# Patient Record
Sex: MALE | Race: WHITE | URBAN - METROPOLITAN AREA
[De-identification: names, ages, dates, MRNs, and addresses within clinical notes are randomized per-mention and may not be internally consistent; named-entity substitution may affect disease eponyms.]

---

## 2017-08-21 ENCOUNTER — OFFICE VISIT (OUTPATIENT)
Dept: FAMILY MEDICINE | Facility: CLINIC | Age: 27
End: 2017-08-21
Payer: COMMERCIAL

## 2017-08-21 VITALS
BODY MASS INDEX: 22.79 KG/M2 | WEIGHT: 162.8 LBS | SYSTOLIC BLOOD PRESSURE: 132 MMHG | HEIGHT: 71 IN | DIASTOLIC BLOOD PRESSURE: 78 MMHG | HEART RATE: 72 BPM | TEMPERATURE: 97.9 F | OXYGEN SATURATION: 99 %

## 2017-08-21 DIAGNOSIS — Z00.00 ROUTINE GENERAL MEDICAL EXAMINATION AT A HEALTH CARE FACILITY: Primary | ICD-10-CM

## 2017-08-21 DIAGNOSIS — F11.11 HISTORY OF OPIOID ABUSE (H): ICD-10-CM

## 2017-08-21 PROCEDURE — 99395 PREV VISIT EST AGE 18-39: CPT | Performed by: PHYSICIAN ASSISTANT

## 2017-08-21 PROCEDURE — 84443 ASSAY THYROID STIM HORMONE: CPT | Performed by: PHYSICIAN ASSISTANT

## 2017-08-21 PROCEDURE — 87389 HIV-1 AG W/HIV-1&-2 AB AG IA: CPT | Performed by: PHYSICIAN ASSISTANT

## 2017-08-21 PROCEDURE — 80053 COMPREHEN METABOLIC PANEL: CPT | Performed by: PHYSICIAN ASSISTANT

## 2017-08-21 PROCEDURE — 84439 ASSAY OF FREE THYROXINE: CPT | Performed by: PHYSICIAN ASSISTANT

## 2017-08-21 PROCEDURE — 86803 HEPATITIS C AB TEST: CPT | Performed by: PHYSICIAN ASSISTANT

## 2017-08-21 PROCEDURE — 36415 COLL VENOUS BLD VENIPUNCTURE: CPT | Performed by: PHYSICIAN ASSISTANT

## 2017-08-21 RX ORDER — MULTIPLE VITAMINS W/ MINERALS TAB 9MG-400MCG
1 TAB ORAL DAILY
COMMUNITY

## 2017-08-21 NOTE — NURSING NOTE
"Chief Complaint   Patient presents with     New Patient     Physical       Initial /78  Pulse 72  Temp 97.9  F (36.6  C) (Oral)  Ht 5' 10.75\" (1.797 m)  Wt 162 lb 12.8 oz (73.8 kg)  SpO2 99%  BMI 22.87 kg/m2 Estimated body mass index is 22.87 kg/(m^2) as calculated from the following:    Height as of this encounter: 5' 10.75\" (1.797 m).    Weight as of this encounter: 162 lb 12.8 oz (73.8 kg).  Medication Reconciliation: complete    "

## 2017-08-21 NOTE — MR AVS SNAPSHOT
After Visit Summary   8/21/2017    Brie Elmore    MRN: [id number]           Patient Information     Date Of Birth          1990        Visit Information        Provider Department      8/21/2017 4:00 PM Madhavi Barragan [facility]C [facility]        Today's Diagnoses     Routine general medical examination at a health care facility    -  1    History of opioid abuse          Care Instructions      Preventive Health Recommendations  Male Ages 26 - 39    Yearly exam:             See your health care provider every year in order to  o   Review health changes.   o   Discuss preventive care.    o   Review your medicines if your doctor has prescribed any.    You should be tested each year for STDs (sexually transmitted diseases), if you re at risk.     After age 35, talk to your provider about cholesterol testing. If you are at risk for heart disease, have your cholesterol tested at least every 5 years.     If you are at risk for diabetes, you should have a diabetes test (fasting glucose).  Shots: Get a flu shot each year. Get a tetanus shot every 10 years.     Nutrition:    Eat at least 5 servings of fruits and vegetables daily.     Eat whole-grain bread, whole-wheat pasta and brown rice instead of white grains and rice.     Talk to your provider about Calcium and Vitamin D.     Lifestyle    Exercise for at least 150 minutes a week (30 minutes a day, 5 days a week). This will help you control your weight and prevent disease.     Limit alcohol to one drink per day.     No smoking.     Wear sunscreen to prevent skin cancer.     See your dentist every six months for an exam and cleaning.             Follow-ups after your visit        Who to contact     If you have questions or need follow up information about today's clinic visit or your schedule please contact [facility] directly at [phone].  Normal or non-critical lab and imaging results will be communicated to  "you by MyChart, letter or phone within 4 business days after the clinic has received the results. If you do not hear from us within 7 days, please contact the clinic through Attributort or phone. If you have a critical or abnormal lab result, we will notify you by phone as soon as possible.  Submit refill requests through [employer] or call your pharmacy and they will forward the refill request to us. Please allow 3 business days for your refill to be completed.          Additional Information About Your Visit        "[employer] Information     [employer] lets you send messages to your doctor, view your test results, renew your prescriptions, schedule appointments and more. To sign up, go to [email or web address] . Click on \"Log in\" on the left side of the screen, which will take you to the Welcome page. Then click on \"Sign up Now\" on the right side of the page.     You will be asked to enter the access code listed below, as well as some personal information. Please follow the directions to create your username and password.     Your access code is: [email or web address]  Expires: 2017  3:18 PM     Your access code will  in 90 days. If you need help or a new code, please call your [place] clinic or [phone].        Care EveryWhere ID     This is your Care EveryWhere ID. This could be used by other organizations to access your [place] medical records  [id number]        Your Vitals Were     Pulse Temperature Height Pulse Oximetry BMI (Body Mass Index)       72 97.9  F (36.6  C) (Oral) 5' 10.75\" (1.797 m) 99% 22.87 kg/m2        Blood Pressure from Last 3 Encounters:   17 132/78    Weight from Last 3 Encounters:   17 162 lb 12.8 oz (73.8 kg)              We Performed the Following     Comprehensive metabolic panel     Hepatitis C antibody     HIV Antigen Antibody Combo     TSH with free T4 reflex        Primary Care Provider    None Specified       No primary provider on file.        Equal Access to Services  "    VERENA ROBLES : Hadii aad george carmina Brown, waaxda luqadaha, qaybta kaalmada jeremiahdarrickmacho, sujata enrique wellsgagely canales. So [facility] [phone].    ATENCIÓN: Si habla español, tiene a meyers disposición servicios gratuitos de asistencia lingüística. Llame al [phone].    We comply with applicable federal civil rights laws and [place] laws. We do not discriminate on the basis of race, color, national origin, age, disability sex, sexual orientation or gender identity.            Thank you!     Thank you for choosing [facility]  for your care. Our goal is always to provide you with excellent care. Hearing back from our patients is one way we can continue to improve our services. Please take a few minutes to complete the written survey that you may receive in the mail after your visit with us. Thank you!             Your Updated Medication List - Protect others around you: Learn how to safely use, store and throw away your medicines at www.disposemymeds.org.          This list is accurate as of: 8/21/17  4:29 PM.  Always use your most recent med list.                   Brand Name Dispense Instructions for use Diagnosis    Multi-vitamin Tabs tablet      Take 1 tablet by mouth daily        SUBUTEX SL      Place 8 mg under the tongue 3 times daily

## 2017-08-21 NOTE — LETTER
[facility]  [address] [place]  [address]  [phone]        August 23, 2017    Brie Elmore  [address] [facility] [address]        Dear Brie,    Your thyroid labs are abnormal. If your specialist does not address this, please follow up in clinic to discuss treatment options.     Results for orders placed or performed in visit on 08/21/17   Hepatitis C antibody   Result Value Ref Range    Hepatitis C Antibody Nonreactive NR^Nonreactive   HIV Antigen Antibody Combo   Result Value Ref Range    HIV Antigen Antibody Combo Nonreactive NR^Nonreactive       TSH with free T4 reflex   Result Value Ref Range    TSH 11.50 (H) 0.40 - 4.00 mU/L   Comprehensive metabolic panel   Result Value Ref Range    Sodium 142 133 - 144 mmol/L    Potassium 3.8 3.4 - 5.3 mmol/L    Chloride 107 94 - 109 mmol/L    Carbon Dioxide 30 20 - 32 mmol/L    Anion Gap 5 3 - 14 mmol/L    Glucose 125 (H) 70 - 99 mg/dL    Urea Nitrogen 27 7 - 30 mg/dL    Creatinine 0.81 0.66 - 1.25 mg/dL    GFR Estimate >90 >60 mL/min/1.7m2    GFR Estimate If Black >90 >60 mL/min/1.7m2    Calcium 8.8 8.5 - 10.1 mg/dL    Bilirubin Total 0.3 0.2 - 1.3 mg/dL    Albumin 4.5 3.4 - 5.0 g/dL    Protein Total 7.2 6.8 - 8.8 g/dL    Alkaline Phosphatase 63 40 - 150 U/L    ALT 40 0 - 70 U/L    AST 30 0 - 45 U/L   T4 free   Result Value Ref Range    T4 Free 0.75 (L) 0.76 - 1.46 ng/dL       If you have any questions please call the clinic at [phone].    Sincerely,    Madhavi TORRES
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
## 2017-08-22 LAB
ALBUMIN SERPL-MCNC: 4.5 G/DL (ref 3.4–5)
ALP SERPL-CCNC: 63 U/L (ref 40–150)
ALT SERPL W P-5'-P-CCNC: 40 U/L (ref 0–70)
ANION GAP SERPL CALCULATED.3IONS-SCNC: 5 MMOL/L (ref 3–14)
AST SERPL W P-5'-P-CCNC: 30 U/L (ref 0–45)
BILIRUB SERPL-MCNC: 0.3 MG/DL (ref 0.2–1.3)
BUN SERPL-MCNC: 27 MG/DL (ref 7–30)
CALCIUM SERPL-MCNC: 8.8 MG/DL (ref 8.5–10.1)
CHLORIDE SERPL-SCNC: 107 MMOL/L (ref 94–109)
CO2 SERPL-SCNC: 30 MMOL/L (ref 20–32)
CREAT SERPL-MCNC: 0.81 MG/DL (ref 0.66–1.25)
GFR SERPL CREATININE-BSD FRML MDRD: >90 ML/MIN/1.7M2
GLUCOSE SERPL-MCNC: 125 MG/DL (ref 70–99)
HCV AB SERPL QL IA: NONREACTIVE
HIV 1+2 AB+HIV1 P24 AG SERPL QL IA: NONREACTIVE
POTASSIUM SERPL-SCNC: 3.8 MMOL/L (ref 3.4–5.3)
PROT SERPL-MCNC: 7.2 G/DL (ref 6.8–8.8)
SODIUM SERPL-SCNC: 142 MMOL/L (ref 133–144)
T4 FREE SERPL-MCNC: 0.75 NG/DL (ref 0.76–1.46)
TSH SERPL DL<=0.005 MIU/L-ACNC: 11.5 MU/L (ref 0.4–4)

## 2017-08-23 NOTE — PROGRESS NOTES
Brie,     Your thyroid labs are abnormal. If your specialist does not address this, please follow up in clinic to discuss treatment options.   Madhavi Barragan PA-C

## 2017-08-24 ENCOUNTER — TELEPHONE (OUTPATIENT)
Dept: FAMILY MEDICINE | Facility: CLINIC | Age: 27
End: 2017-08-24

## 2017-08-24 NOTE — TELEPHONE ENCOUNTER
Reason for Call:  Request for results:    Name of test or procedure: tests from physical    Date of test of procedure: 08-    Location of the test or procedure: [facility]    OK to leave the result message on voice mail or with a family member? YES    Phone number Patient can be reached at:  Home number on file [phone] (home)    Additional comments: Patient is calling to see if results are back yet.    Call taken on 8/24/2017 at 5:34 PM by Misty Malagon

## 2017-08-24 NOTE — TELEPHONE ENCOUNTER
I see lab result note:       Result Notes   Notes Recorded by Megan Casanova on 8/23/2017 at 8:02 AM  Letter mailed to patient.    Megan Casanova,   ------    Notes Recorded by Madhavi Barragan PA-C on 8/23/2017 at 6:36 AM  Brohdy,     Your thyroid labs are abnormal. If your specialist does not address this, please follow up in clinic to discuss treatment options.   Madhavi Barragan PA-C           Letter was mailed.  I called patient, advised him of result note.  Scheduled him for follow up with Madhavi on Monday, he does not see any specialists.    Amelie Brown RN  [facility]

## 2017-08-28 ENCOUNTER — OFFICE VISIT (OUTPATIENT)
Dept: FAMILY MEDICINE | Facility: CLINIC | Age: 27
End: 2017-08-28
Payer: COMMERCIAL

## 2017-08-28 VITALS
HEART RATE: 65 BPM | TEMPERATURE: 98.3 F | WEIGHT: 162.8 LBS | SYSTOLIC BLOOD PRESSURE: 137 MMHG | DIASTOLIC BLOOD PRESSURE: 77 MMHG | OXYGEN SATURATION: 97 % | BODY MASS INDEX: 22.87 KG/M2

## 2017-08-28 DIAGNOSIS — Z72.0 TOBACCO ABUSE: ICD-10-CM

## 2017-08-28 DIAGNOSIS — R79.89 ABNORMAL TSH: Primary | ICD-10-CM

## 2017-08-28 LAB
T4 FREE SERPL-MCNC: 0.7 NG/DL (ref 0.76–1.46)
TSH SERPL DL<=0.005 MIU/L-ACNC: 12.75 MU/L (ref 0.4–4)

## 2017-08-28 PROCEDURE — 36415 COLL VENOUS BLD VENIPUNCTURE: CPT | Performed by: PHYSICIAN ASSISTANT

## 2017-08-28 PROCEDURE — 86376 MICROSOMAL ANTIBODY EACH: CPT | Performed by: PHYSICIAN ASSISTANT

## 2017-08-28 PROCEDURE — 84439 ASSAY OF FREE THYROXINE: CPT | Performed by: PHYSICIAN ASSISTANT

## 2017-08-28 PROCEDURE — 84443 ASSAY THYROID STIM HORMONE: CPT | Performed by: PHYSICIAN ASSISTANT

## 2017-08-28 PROCEDURE — 99213 OFFICE O/P EST LOW 20 MIN: CPT | Performed by: PHYSICIAN ASSISTANT

## 2017-08-28 RX ORDER — NICOTINE 21 MG/24HR
1 PATCH, TRANSDERMAL 24 HOURS TRANSDERMAL EVERY 24 HOURS
Qty: 30 PATCH | Refills: 0 | Status: SHIPPED | OUTPATIENT
Start: 2017-08-28 | End: 2018-02-05

## 2017-08-28 NOTE — PATIENT INSTRUCTIONS
Repeat labs today.   Schedule the ultrasound.     Once I have both tests back I will call about starting the medications.

## 2017-08-28 NOTE — PROGRESS NOTES
SUBJECTIVE:   Brie Elmore is a 27 year old male who presents to clinic today for the following health issues:      Pt is here to follow-up on labs.     Patient is wanting to stop the subutex, he is tapering from it.     Patient is smoking about 1/4ppd and would like to quit. Is interested in using the patches.     Maternal aunt and grandmother with hypothyroidism.   He did look it up online and noticed that he has fatigue, insomnia. He has not had any weight gain.   These labs were originally ordered by his subutex specialist.     Problem list and histories reviewed & adjusted, as indicated.  Additional history: as documented    Patient Active Problem List   Diagnosis     History of opioid abuse     Past Surgical History:   Procedure Laterality Date     ORTHOPEDIC SURGERY  2010    right hand fracture       Social History   Substance Use Topics     Smoking status: Current Every Day Smoker     Packs/day: 0.50     Years: 10.00     Types: Cigarettes     Smokeless tobacco: Former User     Alcohol use No     Family History   Problem Relation Age of Onset     Hypertension Mother      Hypertension Father      Coronary Artery Disease Paternal Grandfather      Emphysema Brother              Reviewed and updated as needed this visit by clinical staff  Tobacco  Allergies  Meds  Problems  Med Hx  Surg Hx  Fam Hx  Soc Hx        Reviewed and updated as needed this visit by Provider  Allergies  Meds  Problems         ROS:  Constitutional, HEENT, cardiovascular, pulmonary, gi and gu systems are negative, except as otherwise noted.      OBJECTIVE:   /77 (BP Location: Left arm, Patient Position: Chair, Cuff Size: Adult Regular)  Pulse 65  Temp 98.3  F (36.8  C) (Oral)  Wt 162 lb 12.8 oz (73.8 kg)  SpO2 97%  BMI 22.87 kg/m2  Body mass index is 22.87 kg/(m^2).  GENERAL: healthy, alert and no distress  NECK: no adenopathy, no asymmetry, masses, or scars and thyroid normal to palpation    Diagnostic Test  Results:  none     ASSESSMENT/PLAN:       ICD-10-CM    1. Abnormal TSH R94.6 T4 free     TSH     Thyroid peroxidase antibody     US Thyroid   2. Tobacco abuse Z72.0 nicotine (NICODERM CQ) 14 MG/24HR 24 hr patch     nicotine (NICODERM CQ) 7 MG/24HR 24 hr patch   Repeat labs prior to starting therapy. Advised on a thyroid u/s as well.   Will start levothyroxine based on labs. Will call patient with instructions.   Ok to start using nicoderm patches to help quit smoking.         Madhavi Barragan PA-C  [facility]

## 2017-08-28 NOTE — MR AVS SNAPSHOT
"              After Visit Summary   8/28/2017    Brie Elmore    MRN: [id number]           Patient Information     Date Of Birth          1990        Visit Information        Provider Department      8/28/2017 3:40 PM Madhavi Barragan PA-C [facility]        Today's Diagnoses     Abnormal TSH    -  1    Tobacco abuse          Care Instructions    Repeat labs today.   Schedule the ultrasound.     Once I have both tests back I will call about starting the medications.           Follow-ups after your visit        Future tests that were ordered for you today     Open Future Orders        Priority Expected Expires Ordered    US Thyroid Routine  8/28/2018 8/28/2017            Who to contact     If you have questions or need follow up information about today's clinic visit or your schedule please contact [facility] directly at [phone].  Normal or non-critical lab and imaging results will be communicated to you by MyChart, letter or phone within 4 business days after the clinic has received the results. If you do not hear from us within 7 days, please contact the clinic through MyChart or phone. If you have a critical or abnormal lab result, we will notify you by phone as soon as possible.  Submit refill requests through [employer] or call your pharmacy and they will forward the refill request to us. Please allow 3 business days for your refill to be completed.          Additional Information About Your Visit        MyChart Information     [employer] lets you send messages to your doctor, view your test results, renew your prescriptions, schedule appointments and more. To sign up, go to [email or web address] . Click on \"Log in\" on the left side of the screen, which will take you to the Welcome page. Then click on \"Sign up Now\" on the right side of the page.     You will be asked to enter the access code listed below, as well as some personal information. Please follow the " directions to create your username and password.     Your access code is: [email or web address]  Expires: 2017  3:18 PM     Your access code will  in 90 days. If you need help or a new code, please call your [place] clinic or [phone].        Care EveryWhere ID     This is your Care EveryWhere ID. This could be used by other organizations to access your [place] medical records  [id number]        Your Vitals Were     Pulse Temperature Pulse Oximetry BMI (Body Mass Index)          65 98.3  F (36.8  C) (Oral) 97% 22.87 kg/m2         Blood Pressure from Last 3 Encounters:   17 137/77   17 132/78    Weight from Last 3 Encounters:   17 162 lb 12.8 oz (73.8 kg)   17 162 lb 12.8 oz (73.8 kg)              We Performed the Following     T4 free     Thyroid peroxidase antibody     TSH          Today's Medication Changes          These changes are accurate as of: 17  4:05 PM.  If you have any questions, ask your nurse or doctor.               Start taking these medicines.        Dose/Directions    * nicotine 14 MG/24HR 24 hr patch   Commonly known as:  NICODERM CQ   Used for:  Tobacco abuse   Started by:  Madhavi Barragan PA-C        Dose:  1 patch   Place 1 patch onto the skin every 24 hours   Quantity:  30 patch   Refills:  0       * nicotine 7 MG/24HR 24 hr patch   Commonly known as:  NICODERM CQ   Used for:  Tobacco abuse   Started by:  Madhavi Barragan PA-C        Dose:  1 patch   Place 1 patch onto the skin every 24 hours For use after the 14mg patches.   Quantity:  30 patch   Refills:  0       * Notice:  This list has 2 medication(s) that are the same as other medications prescribed for you. Read the directions carefully, and ask your doctor or other care provider to review them with you.         Where to get your medicines      These medications were sent to [facility] [place], [place] - [address]  [address], [facility]  [address]     Phone:  [phone]     nicotine 14 MG/24HR 24 hr patch    nicotine 7 MG/24HR 24 hr patch                Primary Care Provider    None Specified       No primary provider on file.        Equal Access to Services     VERENA ROBLES : Hadii aad ku hadbella Brown, wajoaoda luqadaha, qaybta kaalmada charles, sujata bird laElizabethaugustine parker. So [facility] [phone].    ATENCIÓN: Si habla español, tiene a meyers disposición servicios gratuitos de asistencia lingüística. Llame al [phone].    We comply with applicable federal civil rights laws and [place] laws. We do not discriminate on the basis of race, color, national origin, age, disability sex, sexual orientation or gender identity.            Thank you!     Thank you for choosing [facility]  for your care. Our goal is always to provide you with excellent care. Hearing back from our patients is one way we can continue to improve our services. Please take a few minutes to complete the written survey that you may receive in the mail after your visit with us. Thank you!             Your Updated Medication List - Protect others around you: Learn how to safely use, store and throw away your medicines at www.disposemymeds.org.          This list is accurate as of: 8/28/17  4:05 PM.  Always use your most recent med list.                   Brand Name Dispense Instructions for use Diagnosis    Multi-vitamin Tabs tablet      Take 1 tablet by mouth daily        * nicotine 14 MG/24HR 24 hr patch    NICODERM CQ    30 patch    Place 1 patch onto the skin every 24 hours    Tobacco abuse       * nicotine 7 MG/24HR 24 hr patch    NICODERM CQ    30 patch    Place 1 patch onto the skin every 24 hours For use after the 14mg patches.    Tobacco abuse       SUBUTEX SL      Place 8 mg under the tongue 3 times daily        * Notice:  This list has 2 medication(s) that are the same as other medications prescribed for you. Read the directions  carefully, and ask your doctor or other care provider to review them with you.

## 2017-08-28 NOTE — NURSING NOTE
"Chief Complaint   Patient presents with     Follow-up Labs     thyroid       Initial /77 (BP Location: Left arm, Patient Position: Chair, Cuff Size: Adult Regular)  Pulse 65  Temp 98.3  F (36.8  C) (Oral)  Wt 162 lb 12.8 oz (73.8 kg)  SpO2 97%  BMI 22.87 kg/m2 Estimated body mass index is 22.87 kg/(m^2) as calculated from the following:    Height as of 8/21/17: 5' 10.75\" (1.797 m).    Weight as of this encounter: 162 lb 12.8 oz (73.8 kg).  Medication Reconciliation: complete   POLI Hernandez MA      "

## 2017-08-29 ENCOUNTER — TELEPHONE (OUTPATIENT)
Dept: FAMILY MEDICINE | Facility: CLINIC | Age: 27
End: 2017-08-29

## 2017-08-29 DIAGNOSIS — E06.3 HYPOTHYROIDISM DUE TO HASHIMOTO'S THYROIDITIS: Primary | ICD-10-CM

## 2017-08-29 LAB — THYROPEROXIDASE AB SERPL-ACNC: 370 IU/ML

## 2017-08-29 RX ORDER — LEVOTHYROXINE SODIUM 50 UG/1
50 TABLET ORAL DAILY
Qty: 90 TABLET | Refills: 0 | Status: SHIPPED | OUTPATIENT
Start: 2017-08-29 | End: 2017-11-08

## 2017-08-29 NOTE — TELEPHONE ENCOUNTER
Attempt # 1  Called patient at home number.  Was call answered?  No left message to call nurse line so we can verify pharmacy and really below information.  Isabel Avery RN  [facility]

## 2017-08-29 NOTE — TELEPHONE ENCOUNTER
Please call patient.   His thyroid labs are still abnormal on our repeat check. I would like him to get the thyroid u/s scheduled that we talked about and I would like him to start on 50mcg daily. He should follow up with me in 6 weeks for repeat labs and to check on medications. RN can send to the preferred pharmacy.   Madhavi Barragan PA-C

## 2017-08-29 NOTE — TELEPHONE ENCOUNTER
Patient called back.  RN informed of message as below.  He verbalized understanding and agrees with plan of care.  He would like Rx to go to [facility] pharmacy, requests RN send now and have it ready for him as he's on his way.  RN did as asked, spoke with Sabiha in pharmacy who will get this ready for him.    He has u/s scheduled tomorrow - would like provider to know that.    Declined to schedule 6 week f/up, he states he will have to call back later.    Routed to Madhavi for FYI of tomorrows u/s.    Tamica New RN  [facility]

## 2017-08-30 ENCOUNTER — RADIANT APPOINTMENT (OUTPATIENT)
Dept: ULTRASOUND IMAGING | Facility: CLINIC | Age: 27
End: 2017-08-30
Attending: PHYSICIAN ASSISTANT
Payer: COMMERCIAL

## 2017-08-30 DIAGNOSIS — R79.89 ABNORMAL TSH: ICD-10-CM

## 2017-08-30 PROCEDURE — 76536 US EXAM OF HEAD AND NECK: CPT

## 2017-08-30 NOTE — LETTER
[facility]  [address] [place]  [address]  [phone]        August 31, 2017    Brie Elmore  [address] [facility] [address]        Dear Brie,    Your thyroid gland is slightly large, but otherwise normal. This is consistent with your labs. Please follow up in 6 weeks as previously discussed.     Results for orders placed or performed in visit on 08/30/17   US Thyroid    Narrative    ULTRASOUND THYROID 8/30/2017 6:35 PM     HISTORY: Abnormal results of thyroid function studies.     FINDINGS: Thyroid ultrasound demonstrates an enlarged thyroid gland.  The right lobe measures 5.3 x 2.2 x 2.0 cm. The left lobe measures 5.6  x 1.8 x 1.5 cm. The isthmus is normal in thickness. Thyroid parenchyma  is heterogeneous in echotexture.    Thyroid nodules as follows:   Right Lobe: None.    Isthmus: None.    Left Lobe: None.      Impression    IMPRESSION: Enlarged thyroid gland with heterogeneity of the  parenchyma, but no discrete nodules or cysts.    JITENDRA AYON MD       If you have any questions please call the clinic at [phone].    Sincerely,    Madhavi SCHROEDER

## 2017-08-31 NOTE — PROGRESS NOTES
Brie,     Your thyroid gland is slightly large, but otherwise normal. This is consistent with your labs. Please follow up in 6 weeks as previously discussed.   Madhavi Barragan PA-C

## 2017-10-23 ENCOUNTER — TELEPHONE (OUTPATIENT)
Dept: FAMILY MEDICINE | Facility: CLINIC | Age: 27
End: 2017-10-23

## 2017-10-23 NOTE — TELEPHONE ENCOUNTER
90 day supply sent to [facility] pharmacy on 8/29/17.      Called patient, he did pick this up.  RN advised this is a 90 day supply so he should be good until end of November.  He did not know how much he had left in his bottle but wanted to make sure it would last until the appt.  RN confirmed if he has not lost any or taken it incorrectly, he will have supply until end of November.  He verbalized understanding.      Tamica New RN  [facility]

## 2017-10-23 NOTE — TELEPHONE ENCOUNTER
Reason for Call:  Medication or medication refill:    Name of the pharmacy and phone number for the current request: [facility] [address]      Name of the medication requested: Levothyroxine    Other request: Patient will need a refill before next appointment with you.  Patient is scheduled to see you on 11- @ 6:20 PM    Can we leave a detailed message on this number? YES    Phone number patient can be reached at: Home number on file [phone] (home)    Best Time: anytime    Call taken on 10/23/2017 at 1:17 PM by Misty Malagon

## 2017-11-05 ENCOUNTER — NURSE TRIAGE (OUTPATIENT)
Dept: NURSING | Facility: CLINIC | Age: 27
End: 2017-11-05

## 2017-11-05 NOTE — TELEPHONE ENCOUNTER
Patient calling to verify appointment time tomorrow. Information given.    Valeria Mcclain RN/ [facility]

## 2017-11-06 ENCOUNTER — OFFICE VISIT (OUTPATIENT)
Dept: FAMILY MEDICINE | Facility: CLINIC | Age: 27
End: 2017-11-06
Payer: COMMERCIAL

## 2017-11-06 VITALS
TEMPERATURE: 98.2 F | BODY MASS INDEX: 22.56 KG/M2 | SYSTOLIC BLOOD PRESSURE: 117 MMHG | OXYGEN SATURATION: 100 % | DIASTOLIC BLOOD PRESSURE: 72 MMHG | WEIGHT: 160.6 LBS | HEART RATE: 73 BPM

## 2017-11-06 DIAGNOSIS — E06.3 HYPOTHYROIDISM DUE TO HASHIMOTO'S THYROIDITIS: Primary | ICD-10-CM

## 2017-11-06 PROCEDURE — 99212 OFFICE O/P EST SF 10 MIN: CPT | Performed by: PHYSICIAN ASSISTANT

## 2017-11-06 PROCEDURE — 84443 ASSAY THYROID STIM HORMONE: CPT | Performed by: PHYSICIAN ASSISTANT

## 2017-11-06 PROCEDURE — 84439 ASSAY OF FREE THYROXINE: CPT | Performed by: PHYSICIAN ASSISTANT

## 2017-11-06 PROCEDURE — 36415 COLL VENOUS BLD VENIPUNCTURE: CPT | Performed by: PHYSICIAN ASSISTANT

## 2017-11-06 NOTE — MR AVS SNAPSHOT
"              After Visit Summary   2017    Brie Elmore    MRN: [id number]           Patient Information     Date Of Birth          1990        Visit Information        Provider Department      2017 6:20 PM Madhavi Barragan PA-C [facility]        Today's Diagnoses     Hypothyroidism due to Hashimoto's thyroiditis    -  1       Follow-ups after your visit        Who to contact     If you have questions or need follow up information about today's clinic visit or your schedule please contact [facility] directly at [phone].  Normal or non-critical lab and imaging results will be communicated to you by MobiWorkhart, letter or phone within 4 business days after the clinic has received the results. If you do not hear from us within 7 days, please contact the clinic through MobiWorkhart or phone. If you have a critical or abnormal lab result, we will notify you by phone as soon as possible.  Submit refill requests through RedBee or call your pharmacy and they will forward the refill request to us. Please allow 3 business days for your refill to be completed.          Additional Information About Your Visit        MyChart Information     RedBee lets you send messages to your doctor, view your test results, renew your prescriptions, schedule appointments and more. To sign up, go to [email or web address] . Click on \"Log in\" on the left side of the screen, which will take you to the Welcome page. Then click on \"Sign up Now\" on the right side of the page.     You will be asked to enter the access code listed below, as well as some personal information. Please follow the directions to create your username and password.     Your access code is: [email or web address]  Expires: 2017  2:18 PM     Your access code will  in 90 days. If you need help or a new code, please call your [facility] or [phone].        Care EveryWhere ID     This is your Care EveryWhere ID. " This could be used by other organizations to access your [facility] medical records  [id number]        Your Vitals Were     Pulse Temperature Pulse Oximetry BMI (Body Mass Index)          73 98.2  F (36.8  C) (Oral) 100% 22.56 kg/m2         Blood Pressure from Last 3 Encounters:   11/06/17 117/72   08/28/17 137/77   08/21/17 132/78    Weight from Last 3 Encounters:   11/06/17 160 lb 9.6 oz (72.8 kg)   08/28/17 162 lb 12.8 oz (73.8 kg)   08/21/17 162 lb 12.8 oz (73.8 kg)              We Performed the Following     TSH with free T4 reflex        Primary Care Provider Office Phone # Fax #    [facility] [phone]       [address] [place] [address]        Equal Access to Services     VERENA ROBLES : Lou grewal Soeliane, waaxda ida, qaybta kaalmamacho soto, sujata carpenter . So [facility] [phone].    ATENCIÓN: Si habla español, tiene a meyers disposición servicios gratuitos de asistencia lingüística. Llame al [phone].    We comply with applicable federal civil rights laws and [place] laws. We do not discriminate on the basis of race, color, national origin, age, disability, sex, sexual orientation, or gender identity.            Thank you!     Thank you for choosing [facility]  for your care. Our goal is always to provide you with excellent care. Hearing back from our patients is one way we can continue to improve our services. Please take a few minutes to complete the written survey that you may receive in the mail after your visit with us. Thank you!             Your Updated Medication List - Protect others around you: Learn how to safely use, store and throw away your medicines at www.disposemymeds.org.          This list is accurate as of: 11/6/17  6:35 PM.  Always use your most recent med list.                   Brand Name Dispense Instructions for use Diagnosis    levothyroxine 50 MCG tablet     SYNTHROID/LEVOTHROID    90 tablet    Take 1 tablet (50 mcg) by mouth daily    Hypothyroidism due to Hashimoto's thyroiditis       Multi-vitamin Tabs tablet      Take 1 tablet by mouth daily        * nicotine 14 MG/24HR 24 hr patch    NICODERM CQ    30 patch    Place 1 patch onto the skin every 24 hours    Tobacco abuse       * nicotine 7 MG/24HR 24 hr patch    NICODERM CQ    30 patch    Place 1 patch onto the skin every 24 hours For use after the 14mg patches.    Tobacco abuse       SUBUTEX SL      Place 2 mg under the tongue 3 times daily        * Notice:  This list has 2 medication(s) that are the same as other medications prescribed for you. Read the directions carefully, and ask your doctor or other care provider to review them with you.

## 2017-11-07 LAB
T4 FREE SERPL-MCNC: 1.01 NG/DL (ref 0.76–1.46)
TSH SERPL DL<=0.005 MIU/L-ACNC: 5.33 MU/L (ref 0.4–4)

## 2017-11-07 NOTE — NURSING NOTE
"Chief Complaint   Patient presents with     Thyroid Problem     Health Maintenance     Influenza        Initial /72 (BP Location: Left arm, Patient Position: Chair, Cuff Size: Adult Regular)  Pulse 73  Temp 98.2  F (36.8  C) (Oral)  Wt 160 lb 9.6 oz (72.8 kg)  SpO2 100%  BMI 22.56 kg/m2 Estimated body mass index is 22.56 kg/(m^2) as calculated from the following:    Height as of 8/21/17: 5' 10.75\" (1.797 m).    Weight as of this encounter: 160 lb 9.6 oz (72.8 kg).  Medication Reconciliation: complete     POLI Hernandez MA      "

## 2017-11-07 NOTE — PROGRESS NOTES
"  SUBJECTIVE:   Brie Elmore is a 27 year old male who presents to clinic today for the following health issues:      Hypothyroidism Follow-up      Since last visit, patient describes the following symptoms: Weight stable, no hair loss, no skin changes, no constipation, no loose stools    Amount of exercise or physical activity: 4-5 days/week for an average of greater than 60 minutes    Problems taking medications regularly: Yes, sometimes to remember every morning    Medication side effects: none    Diet: regular (no restrictions)      He has noticed if he doesn't take it, \"he feels like crap.\"Tries to take it at the same time everyday, but later on the weekends. Rarely misses it.     Still on the subutex. He is on a slow taper. Still sober.     Problem list and histories reviewed & adjusted, as indicated.  Additional history: as documented    Patient Active Problem List   Diagnosis     History of opioid abuse     Hypothyroidism due to Hashimoto's thyroiditis     Past Surgical History:   Procedure Laterality Date     ORTHOPEDIC SURGERY  2010    right hand fracture       Social History   Substance Use Topics     Smoking status: Current Every Day Smoker     Packs/day: 0.50     Years: 10.00     Types: Cigarettes     Smokeless tobacco: Former User     Alcohol use No     Family History   Problem Relation Age of Onset     Hypertension Mother      Hypertension Father      Coronary Artery Disease Paternal Grandfather      Emphysema Brother              Reviewed and updated as needed this visit by clinical staff     Reviewed and updated as needed this visit by Provider         ROS:  Constitutional, HEENT, cardiovascular, pulmonary, gi and gu systems are negative, except as otherwise noted.      OBJECTIVE:   /72 (BP Location: Left arm, Patient Position: Chair, Cuff Size: Adult Regular)  Pulse 73  Temp 98.2  F (36.8  C) (Oral)  Wt 160 lb 9.6 oz (72.8 kg)  SpO2 100%  BMI 22.56 kg/m2  Body mass index is 22.56 " kg/(m^2).  GENERAL: healthy, alert and no distress    Diagnostic Test Results:  none     ASSESSMENT/PLAN:       ICD-10-CM    1. Hypothyroidism due to Hashimoto's thyroiditis E03.8 TSH with free T4 reflex    E06.3    Will get TSH today, may need to adjust levothyroxine based on results.   Tolerating well.     FUTURE APPOINTMENTS:       - Follow-up visit in based on labs.     Madhavi Barragan PA-C  [facility]  >50% of the visit spent in counseling and coordination of care about thyroid. Visit length 10 minutes.

## 2017-11-08 DIAGNOSIS — E06.3 HYPOTHYROIDISM DUE TO HASHIMOTO'S THYROIDITIS: Primary | ICD-10-CM

## 2017-11-08 RX ORDER — LEVOTHYROXINE SODIUM 50 UG/1
50 TABLET ORAL DAILY
Qty: 90 TABLET | Refills: 0 | Status: SHIPPED | OUTPATIENT
Start: 2017-11-08 | End: 2018-02-06 | Stop reason: DRUGHIGH

## 2017-11-08 NOTE — PROGRESS NOTES
Ayush Baird,     Your TSH is still slightly high, but your T4- the circulating thyroid hormone has improved. I would like you to stay on your same dose of medication for now and just come to the lab in 3 months for a lab only visit. You do not need to see me. We will check these levels again then. Please let me know if you have any questions.   Madhavi Barragan PA-C

## 2017-11-16 ENCOUNTER — MYC MEDICAL ADVICE (OUTPATIENT)
Dept: FAMILY MEDICINE | Facility: CLINIC | Age: 27
End: 2017-11-16

## 2017-11-17 NOTE — TELEPHONE ENCOUNTER
See result note regarding TSH and T4, was read by patient this evening:    Patient Result Comments   Entered by Madhavi Barragan PA-C at 11/8/2017  6:50 AM   Read by Brie Elmore at 11/16/2017  5:24 PM   Hi Brie,     Your TSH is still slightly high, but your T4- the circulating thyroid hormone has improved. I would like you to stay on your same dose of medication for now and just come to the lab in 3 months for a lab only visit. You do not need to see me. We will check these levels again then. Please let me know if you have any questions.   Madhavi Barragan PA-C          Routed response to patient advising Rx was sent.  Schedule lab only in 3 months.    Amelie Brown RN  [facility]

## 2018-02-05 ENCOUNTER — OFFICE VISIT (OUTPATIENT)
Dept: FAMILY MEDICINE | Facility: CLINIC | Age: 28
End: 2018-02-05
Payer: COMMERCIAL

## 2018-02-05 VITALS
HEART RATE: 83 BPM | TEMPERATURE: 98 F | BODY MASS INDEX: 23.21 KG/M2 | DIASTOLIC BLOOD PRESSURE: 78 MMHG | SYSTOLIC BLOOD PRESSURE: 128 MMHG | WEIGHT: 165.2 LBS

## 2018-02-05 DIAGNOSIS — R53.83 FATIGUE, UNSPECIFIED TYPE: Primary | ICD-10-CM

## 2018-02-05 DIAGNOSIS — L72.0 EPIDERMAL CYST: ICD-10-CM

## 2018-02-05 DIAGNOSIS — E06.3 HYPOTHYROIDISM DUE TO HASHIMOTO'S THYROIDITIS: ICD-10-CM

## 2018-02-05 LAB — HBA1C MFR BLD: 5.2 % (ref 4.3–6)

## 2018-02-05 PROCEDURE — 84443 ASSAY THYROID STIM HORMONE: CPT | Performed by: PHYSICIAN ASSISTANT

## 2018-02-05 PROCEDURE — 84439 ASSAY OF FREE THYROXINE: CPT | Performed by: PHYSICIAN ASSISTANT

## 2018-02-05 PROCEDURE — 36415 COLL VENOUS BLD VENIPUNCTURE: CPT | Performed by: PHYSICIAN ASSISTANT

## 2018-02-05 PROCEDURE — 99213 OFFICE O/P EST LOW 20 MIN: CPT | Performed by: PHYSICIAN ASSISTANT

## 2018-02-05 PROCEDURE — 83036 HEMOGLOBIN GLYCOSYLATED A1C: CPT | Performed by: PHYSICIAN ASSISTANT

## 2018-02-05 NOTE — NURSING NOTE
"Chief Complaint   Patient presents with     Thyroid Problem     Health Maintenance     Influenza      Mass     on left hand       Initial /78 (BP Location: Right arm, Patient Position: Chair, Cuff Size: Adult Regular)  Pulse 83  Temp 98  F (36.7  C) (Oral)  Wt 165 lb 3.2 oz (74.9 kg)  BMI 23.21 kg/m2 Estimated body mass index is 23.21 kg/(m^2) as calculated from the following:    Height as of 8/21/17: 5' 10.75\" (1.797 m).    Weight as of this encounter: 165 lb 3.2 oz (74.9 kg).  Medication Reconciliation: complete     POLI Hernandez MA      "

## 2018-02-05 NOTE — PROGRESS NOTES
SUBJECTIVE:   Brie Elmore is a 27 year old male who presents to clinic today for the following health issues:      Hypothyroidism Follow-up      Since last visit, patient describes the following symptoms: fatigue and muscles    Amount of exercise or physical activity: 4-5 days/week for an average of greater than 60 minutes    Problems taking medications regularly: No    Medication side effects: muscle aches and fatigue    Diet: regular (no restrictions)    - Feels more fatigued and having muscle cramps for past few months, wonders if it is a side effect of the medication    Pt has a mass on his left hand. Pt stated that there is no pain on the bump on his hand. Pt has noticed it has grown in size. Pt noticed the mass x3 weeks ago.     - Small bump on top of left hand, not painful or bothersome. Pt wants to make sure it is not a blood clot. Has grown slightly    - Not taking buprenorphine, no opiate use since   - Quit smoking cigarettes three months ago, still using an e-cig  - Mentions that when he is laying in bed with his arms above his head, his hands often fall asleep. No pain associated.       Problem list and histories reviewed & adjusted, as indicated.  Additional history: as documented    Patient Active Problem List   Diagnosis     History of opioid abuse     Hypothyroidism due to Hashimoto's thyroiditis     Past Surgical History:   Procedure Laterality Date     ORTHOPEDIC SURGERY  2010    right hand fracture       Social History   Substance Use Topics     Smoking status: Former Smoker     Packs/day: 0.50     Years: 10.00     Types: Cigarettes     Quit date: 11/5/2017     Smokeless tobacco: Former User     Alcohol use No     Family History   Problem Relation Age of Onset     Hypertension Mother      Hypertension Father      Coronary Artery Disease Paternal Grandfather      Emphysema Brother            Reviewed and updated as needed this visit by clinical staff  Allergies  Meds  Problems       Reviewed  and updated as needed this visit by Provider  Allergies  Meds  Problems         ROS:  Constitutional, HEENT, cardiovascular, pulmonary, gi and gu systems are negative, except as otherwise noted.    OBJECTIVE:     /78 (BP Location: Right arm, Patient Position: Chair, Cuff Size: Adult Regular)  Pulse 83  Temp 98  F (36.7  C) (Oral)  Wt 165 lb 3.2 oz (74.9 kg)  BMI 23.21 kg/m2  Body mass index is 23.21 kg/(m^2).  GENERAL: healthy, alert and no distress  SKIN: 1-2mm epidermoid cyst on dorsal left hand, no rashes or lesions   NECK: no adenopathy, no asymmetry, masses, or scars and thyroid normal to palpation  RESP: lungs clear to auscultation - no rales, rhonchi or wheezes  CV: regular rate and rhythm, normal S1 S2, no S3 or S4, no murmur, click or rub, no peripheral edema and peripheral pulses strong  ABDOMEN: soft, nontender, no hepatosplenomegaly, no masses and bowel sounds normal  MS: no gross musculoskeletal defects noted, no edema      Diagnostic Test Results:  none     ASSESSMENT/PLAN:       ICD-10-CM    1. Fatigue, unspecified type R53.83 Hemoglobin A1c   2. Hypothyroidism due to Hashimoto's thyroiditis E03.8 TSH with free T4 reflex    E06.3    3. Epidermal cyst L72.0      Symptoms are more likely due to his hypothyroidism as opposed to an adverse effect from his levothyroxine. Educated on nature of Hashimoto's progression. Will recheck his thyroid labs and increase dose as necessary.  Monitor hand lesion. At this time appears to be an epidermal cyst. No further work up or intervention needed.     See Patient Instructions    IRMA Britton PA-C  [facility]  The student France Barrera PAS2 acted as a scribe and the encounter documented above was completely performed by myself and the documentation reflects the work I have performed today.   Madhavi Barragan PA-C

## 2018-02-05 NOTE — MR AVS SNAPSHOT
After Visit Summary   2/5/2018    Brie Elmore    MRN: [id number]           Patient Information     Date Of Birth          1990        Visit Information        Provider Department      2/5/2018 4:00 PM Madhavi Barragan PA-C [facility]        Today's Diagnoses     Fatigue, unspecified type    -  1    Hypothyroidism due to Hashimoto's thyroiditis           Follow-ups after your visit        Who to contact     If you have questions or need follow up information about today's clinic visit or your schedule please contact [facility] directly at [phone].  Normal or non-critical lab and imaging results will be communicated to you by HubNamihart, letter or phone within 4 business days after the clinic has received the results. If you do not hear from us within 7 days, please contact the clinic through HubNamihart or phone. If you have a critical or abnormal lab result, we will notify you by phone as soon as possible.  Submit refill requests through The 517 travel or call your pharmacy and they will forward the refill request to us. Please allow 3 business days for your refill to be completed.          Additional Information About Your Visit        MyChart Information     The 517 travel gives you secure access to your electronic health record. If you see a primary care provider, you can also send messages to your care team and make appointments. If you have questions, please call your primary care clinic.  If you do not have a primary care provider, please call [phone] and they will assist you.        Care EveryWhere ID     This is your Care EveryWhere ID. This could be used by other organizations to access your [facility] medical records  [id number]        Your Vitals Were     Pulse Temperature BMI (Body Mass Index)             83 98  F (36.7  C) (Oral) 23.21 kg/m2          Blood Pressure from Last 3 Encounters:   02/05/18 128/78   11/06/17 117/72   08/28/17 137/77    Weight  from Last 3 Encounters:   02/05/18 165 lb 3.2 oz (74.9 kg)   11/06/17 160 lb 9.6 oz (72.8 kg)   08/28/17 162 lb 12.8 oz (73.8 kg)              We Performed the Following     Hemoglobin A1c     TSH with free T4 reflex        Primary Care Provider Office Phone # Fax #    [facility] [phone]       [address] [place] [address]        Equal Access to Services     VERENA ROBLES : Hadii aad ku hadasho Soomaali, waaxda luqadaha, qaybta kaalmada adeegyada, waxay idiin hayaan leticia canales. So [facility] [phone].    ATENCIÓN: Si habla español, tiene a meyers disposición servicios gratuitos de asistencia lingüística. LlSelect Medical [facility] [phone].    We comply with applicable federal civil rights laws and [place] laws. We do not discriminate on the basis of race, color, national origin, age, disability, sex, sexual orientation, or gender identity.            Thank you!     Thank you for choosing [facility]  for your care. Our goal is always to provide you with excellent care. Hearing back from our patients is one way we can continue to improve our services. Please take a few minutes to complete the written survey that you may receive in the mail after your visit with us. Thank you!             Your Updated Medication List - Protect others around you: Learn how to safely use, store and throw away your medicines at www.disposemymeds.org.          This list is accurate as of 2/5/18  4:35 PM.  Always use your most recent med list.                   Brand Name Dispense Instructions for use Diagnosis    levothyroxine 50 MCG tablet    SYNTHROID/LEVOTHROID    90 tablet    Take 1 tablet (50 mcg) by mouth daily    Hypothyroidism due to Hashimoto's thyroiditis       Multi-vitamin Tabs tablet      Take 1 tablet by mouth daily

## 2018-02-06 DIAGNOSIS — E06.3 HYPOTHYROIDISM DUE TO HASHIMOTO'S THYROIDITIS: Primary | ICD-10-CM

## 2018-02-06 LAB
T4 FREE SERPL-MCNC: 0.92 NG/DL (ref 0.76–1.46)
TSH SERPL DL<=0.005 MIU/L-ACNC: 5.56 MU/L (ref 0.4–4)

## 2018-02-06 RX ORDER — LEVOTHYROXINE SODIUM 75 UG/1
75 TABLET ORAL DAILY
Qty: 90 TABLET | Refills: 1 | Status: SHIPPED | OUTPATIENT
Start: 2018-02-06 | End: 2018-07-18

## 2018-02-06 NOTE — PROGRESS NOTES
Ayush Baird,     Your T4 is still normal, but has decreased since our last check. With you having more hypothyroid type symptoms I think we should increase you levothyroxine to 75mcg. I have sent the new prescription to your pharmacy. You should come for a LAB ONLY (you do not need to see me) visit in 3 months to redraw labs.   Madhavi Barragan PA-C

## 2018-02-07 DIAGNOSIS — E06.3 HYPOTHYROIDISM DUE TO HASHIMOTO'S THYROIDITIS: ICD-10-CM

## 2018-02-07 NOTE — TELEPHONE ENCOUNTER
"Requested Prescriptions   Pending Prescriptions Disp Refills     levothyroxine (SYNTHROID/LEVOTHROID) 50 MCG tablet [Pharmacy Med Name: LEVOTHYROXINE 0.05MG (50MCG) TAB] 90 tablet 0    Last Written Prescription Date:  2-6-18  Last Fill Quantity: 90,  # refills: 1   [facility] Office [facility] with [facility] provider:  2-5-18   Future Office Visit:      Sig: TAKE 1 TABLET BY MOUTH DAILY    Thyroid Protocol Failed    2/7/2018  4:02 AM       Failed - Normal TSH on file in past 12 months    Recent Labs   Lab Test  02/05/18   1638   TSH  5.56*             Passed - Patient is 12 years or older       Passed - Recent or future visit with authorizing provider's specialty    Patient had office visit in the last year or has a visit in the next 30 days with authorizing provider.  See \"Patient Info\" tab in inbasket, or \"Choose Columns\" in Meds & Orders section of the refill encounter.               "

## 2018-02-09 RX ORDER — LEVOTHYROXINE SODIUM 50 UG/1
TABLET ORAL
Qty: 90 TABLET | Refills: 0 | OUTPATIENT
Start: 2018-02-09

## 2018-04-04 ENCOUNTER — TELEPHONE (OUTPATIENT)
Dept: FAMILY MEDICINE | Facility: CLINIC | Age: 28
End: 2018-04-04

## 2018-04-04 ENCOUNTER — NURSE TRIAGE (OUTPATIENT)
Dept: NURSING | Facility: CLINIC | Age: 28
End: 2018-04-04

## 2018-04-04 NOTE — TELEPHONE ENCOUNTER
"Clinic Action Needed:Yes, Please call patient [phone]    Reason for Call:Patient is calling requesting clinic appointment for today. Reporting chest pain starting 2 weeks ago. Pain is intermittent lasting seconds at a time. \"Brief sharp.\" Patient reporting episodes of \"hot flashes.\" Denies any current pain. Stating pain is occurring more frequently \"2 times a day.\" Reporting feeling a pronounced \"pop in left side\" of chest last week during episode.   Sharp low level pain with deep breath in today. Denies any known injury.     Sydnie Baldwin RN  [facility]      "

## 2018-04-04 NOTE — TELEPHONE ENCOUNTER
"  Reason for Disposition    Taking a deep breath makes pain worse    Additional Information    Negative: Severe difficulty breathing (e.g., struggling for each breath, speaks in single words)    Negative: Difficult to awaken or acting confused (e.g., disoriented, slurred speech)    Negative: Shock suspected (e.g., cold/pale/clammy skin, too weak to stand, low BP, rapid pulse)    Negative: [1] Chest pain lasts > 5 minutes AND [2] history of heart disease  (i.e., heart attack, bypass surgery, angina, angioplasty, CHF; not just a heart murmur)    Negative: [1] Chest pain lasts > 5 minutes AND [2] described as crushing, pressure-like, or heavy    Negative: [1] Chest pain lasts > 5 minutes AND [2] age > 50    Negative: [1] Chest pain lasts > 5 minutes AND [2] age > 30 AND [3] at least one cardiac risk factor (i.e., hypertension, diabetes, obesity, smoker or strong family history of heart disease)    Negative: [1] Chest pain lasts > 5 minutes AND [2] not relieved with nitroglycerin    Negative: Passed out (i.e., lost consciousness, collapsed and was not responding)    Negative: Heart beating < 50 beats per minute OR > 140 beats per minute    Negative: Visible sweat on face or sweat dripping down face    Negative: Sounds like a life-threatening emergency to the triager    Negative: Followed a chest injury    Negative: SEVERE chest pain    Negative: [1] Intermittent  chest pain or \"angina\" AND [2] increasing in severity or frequency  (Exception: pains lasting a few seconds)    Negative: Pain also present in shoulder(s) or arm(s) or jaw  (Exception: pain is clearly made worse by movement)    Negative: Difficulty breathing    Negative: Dizziness or lightheadedness    Negative: Coughing up blood    Negative: Cocaine use within last 3 days    Negative: History of prior \"blood clot\" in leg or lungs (i.e., deep vein thrombosis, pulmonary embolism)    Negative: Recent illness requiring prolonged bedrest (i.e., immobilization)    " Negative: Hip or leg fracture in past 2 months (e.g., had cast on leg or ankle)    Negative: Major surgery in the past month    Negative: Recent long-distance travel with prolonged time in car, bus, plane, or train (i.e., within past 2 weeks; 6 or  more hours duration)    Negative: Chest pain lasts > 5 minutes (Exceptions: chest pain occurring > 3 days ago and now asymptomatic; same as previously diagnosed heartburn and has accompanying sour taste in mouth)    Protocols used: CHEST PAIN-ADULT-AH

## 2018-04-04 NOTE — TELEPHONE ENCOUNTER
"Clinic Action Needed:Yes, Please call patient [phone]    Reason for Call:Patient is calling requesting clinic appointment for today. Reporting chest pain starting 2 weeks ago. Pain is intermittent lasting seconds at a time. \"Brief sharp.\" Patient reporting episodes of \"hot flashes.\" Denies any current pain. Stating pain is occurring more frequently \"2 times a day.\" Reporting feeling a pronounced \"pop in left side\" of chest last week during episode.   Sharp low level pain with deep breath in today. Denies any known injury.     Sydnie Baldwin RN  [facility]        Per guidelines disposition pain made worse with deep breath ED advised. Patient declines requesting clinic appointment.   Reason for Disposition    Taking a deep breath makes pain worse    Additional Information    Negative: Severe difficulty breathing (e.g., struggling for each breath, speaks in single words)    Negative: Difficult to awaken or acting confused (e.g., disoriented, slurred speech)    Negative: Shock suspected (e.g., cold/pale/clammy skin, too weak to stand, low BP, rapid pulse)    Negative: [1] Chest pain lasts > 5 minutes AND [2] history of heart disease  (i.e., heart attack, bypass surgery, angina, angioplasty, CHF; not just a heart murmur)    Negative: [1] Chest pain lasts > 5 minutes AND [2] described as crushing, pressure-like, or heavy    Negative: [1] Chest pain lasts > 5 minutes AND [2] age > 50    Negative: [1] Chest pain lasts > 5 minutes AND [2] age > 30 AND [3] at least one cardiac risk factor (i.e., hypertension, diabetes, obesity, smoker or strong family history of heart disease)    Negative: [1] Chest pain lasts > 5 minutes AND [2] not relieved with nitroglycerin    Negative: Passed out (i.e., lost consciousness, collapsed and was not responding)    Negative: Heart beating < 50 beats per minute OR > 140 beats per minute    Negative: Visible sweat on face or sweat dripping down face    Negative: Sounds like a " "life-threatening emergency to the triager    Negative: Followed a chest injury    Negative: SEVERE chest pain    Negative: [1] Intermittent  chest pain or \"angina\" AND [2] increasing in severity or frequency  (Exception: pains lasting a few seconds)    Negative: Pain also present in shoulder(s) or arm(s) or jaw  (Exception: pain is clearly made worse by movement)    Negative: Difficulty breathing    Negative: Dizziness or lightheadedness    Negative: Coughing up blood    Negative: Cocaine use within last 3 days    Negative: History of prior \"blood clot\" in leg or lungs (i.e., deep vein thrombosis, pulmonary embolism)    Negative: Recent illness requiring prolonged bedrest (i.e., immobilization)    Negative: Hip or leg fracture in past 2 months (e.g., had cast on leg or ankle)    Negative: Major surgery in the past month    Negative: Recent long-distance travel with prolonged time in car, bus, plane, or train (i.e., within past 2 weeks; 6 or  more hours duration)    Negative: Chest pain lasts > 5 minutes (Exceptions: chest pain occurring > 3 days ago and now asymptomatic; same as previously diagnosed heartburn and has accompanying sour taste in mouth)    Protocols used: CHEST PAIN-ADULT-    "

## 2018-04-04 NOTE — TELEPHONE ENCOUNTER
Attempt # 1  Called patient at home number.  Was call answered?  Yes, scheduled for tomorrow with Madhavi Barragan at patient's request. Denies chest pain at this time. Nurse encouraged to go to ER if SOB, blue lips, tongue, weakness, numbness occur. Patient verbalized understanding and agreement with plan.    Isabel Avery RN  [facility]

## 2018-04-04 NOTE — TELEPHONE ENCOUNTER
Reason for call:  Patient reporting a symptom    Symptom or request: Chest pain    Duration (how long have symptoms been present): A few weeks    Have you been treated for this before? No    Additional comments: Patient called and stated he has been having sharp pain where his heart is. He at one point felt pain and then heard a prominent popping noise and the stopped. Patient stated he is still having the pain here and there but no more popping. Transferred call to [facility].    Phone Number patient can be reached at:  Home number on file [phone] (home)    Best Time:  Anytime    Can we leave a detailed message on this number:  YES    Call taken on 4/4/2018 at 11:36 AM by Abbie Guy

## 2018-06-27 ENCOUNTER — TELEPHONE (OUTPATIENT)
Dept: FAMILY MEDICINE | Facility: CLINIC | Age: 28
End: 2018-06-27

## 2018-06-27 NOTE — TELEPHONE ENCOUNTER
Reason for Call:  Other Request    Detailed comments: Patient requesting labs results for 8/21/17 to be mailed to address on file. Please advise.     Phone Number Patient can be reached at: Home number on file [phone] (home)    Best Time: After 3pm    Can we leave a detailed message on this number? NO    Call taken on 6/27/2018 at 2:54 PM by Tracy Connelly

## 2018-07-18 ENCOUNTER — TELEPHONE (OUTPATIENT)
Dept: FAMILY MEDICINE | Facility: CLINIC | Age: 28
End: 2018-07-18

## 2018-07-18 DIAGNOSIS — E06.3 HYPOTHYROIDISM DUE TO HASHIMOTO'S THYROIDITIS: ICD-10-CM

## 2018-07-18 RX ORDER — LEVOTHYROXINE SODIUM 75 UG/1
TABLET ORAL
Qty: 90 TABLET | Refills: 0 | Status: SHIPPED | OUTPATIENT
Start: 2018-07-18 | End: 2018-07-23

## 2018-07-18 NOTE — TELEPHONE ENCOUNTER
Patient called back, advised of message, and scheduled for lab only on 7/20 at 3:00.    Thanks!!  Asad Kingston  Patient Representative

## 2018-07-18 NOTE — TELEPHONE ENCOUNTER
"Requested Prescriptions   Pending Prescriptions Disp Refills     levothyroxine (SYNTHROID/LEVOTHROID) 75 MCG tablet [Pharmacy Med Name: LEVOTHYROXINE 0.075MG (75MCG) TABS] 90 tablet 0    Last Written Prescription Date:  2-6-18  Last Fill Quantity: 90,  # refills: 1   Last office visit: 2/5/2018 with prescribing provider:  2-5-18   Future Office Visit:     Sig: TAKE 1 TABLET(75 MCG) BY MOUTH DAILY    Thyroid Protocol Failed    7/18/2018  9:47 AM       Failed - Normal TSH on file in past 12 months    Recent Labs   Lab Test  02/05/18   1638   TSH  5.56*             Passed - Patient is 12 years or older       Passed - Recent (12 mo) or future (30 days) visit within the authorizing provider's specialty    Patient had office visit in the last 12 months or has a visit in the next 30 days with authorizing provider or within the authorizing provider's specialty.  See \"Patient Info\" tab in inbasket, or \"Choose Columns\" in Meds & Orders section of the refill encounter.              "

## 2018-07-18 NOTE — TELEPHONE ENCOUNTER
Routing refill request to provider for review/approval because:  Labs out of range  Nancy Driscoll, [facility] Triage.

## 2018-07-18 NOTE — TELEPHONE ENCOUNTER
Attempt # 1  Called patient at home [phone]  Was call answered?  No, mail box full unable to leave message    Isabel Avery RN  [facility]

## 2018-07-18 NOTE — TELEPHONE ENCOUNTER
He should come in for a lab only visit since we changed his dose at the last visit. Med refilled.   Madhavi Barragan PA-C

## 2018-07-20 DIAGNOSIS — E06.3 HYPOTHYROIDISM DUE TO HASHIMOTO'S THYROIDITIS: ICD-10-CM

## 2018-07-20 LAB — TSH SERPL DL<=0.005 MIU/L-ACNC: 3.47 MU/L (ref 0.4–4)

## 2018-07-20 PROCEDURE — 36415 COLL VENOUS BLD VENIPUNCTURE: CPT | Performed by: PHYSICIAN ASSISTANT

## 2018-07-20 PROCEDURE — 84443 ASSAY THYROID STIM HORMONE: CPT | Performed by: PHYSICIAN ASSISTANT

## 2018-07-22 ENCOUNTER — MYC MEDICAL ADVICE (OUTPATIENT)
Dept: FAMILY MEDICINE | Facility: CLINIC | Age: 28
End: 2018-07-22

## 2018-07-22 DIAGNOSIS — E06.3 HYPOTHYROIDISM DUE TO HASHIMOTO'S THYROIDITIS: ICD-10-CM

## 2018-07-23 RX ORDER — LEVOTHYROXINE SODIUM 75 UG/1
TABLET ORAL
Qty: 90 TABLET | Refills: 3 | Status: SHIPPED | OUTPATIENT
Start: 2018-07-23 | End: 2019-09-23

## 2019-02-01 ENCOUNTER — DOCUMENTATION ONLY (OUTPATIENT)
Dept: FAMILY MEDICINE | Facility: CLINIC | Age: 29
End: 2019-02-01

## 2019-02-01 DIAGNOSIS — E06.3 HYPOTHYROIDISM DUE TO HASHIMOTO'S THYROIDITIS: Primary | ICD-10-CM

## 2019-02-01 NOTE — PROGRESS NOTES
Please place  orders for upcoming lab appointment on 2-4-19.  Patient requesting thyroid check.    Thank you  Sabiha Morillo MA  lab

## 2019-02-04 ENCOUNTER — OFFICE VISIT (OUTPATIENT)
Dept: FAMILY MEDICINE | Facility: CLINIC | Age: 29
End: 2019-02-04
Payer: COMMERCIAL

## 2019-02-04 ENCOUNTER — ANCILLARY PROCEDURE (OUTPATIENT)
Dept: GENERAL RADIOLOGY | Facility: CLINIC | Age: 29
End: 2019-02-04
Payer: COMMERCIAL

## 2019-02-04 VITALS
TEMPERATURE: 99 F | SYSTOLIC BLOOD PRESSURE: 110 MMHG | BODY MASS INDEX: 23.19 KG/M2 | HEART RATE: 74 BPM | OXYGEN SATURATION: 95 % | WEIGHT: 162 LBS | DIASTOLIC BLOOD PRESSURE: 67 MMHG | HEIGHT: 70 IN

## 2019-02-04 DIAGNOSIS — K59.00 CONSTIPATION, UNSPECIFIED CONSTIPATION TYPE: ICD-10-CM

## 2019-02-04 DIAGNOSIS — R10.13 ABDOMINAL PAIN, EPIGASTRIC: Primary | ICD-10-CM

## 2019-02-04 DIAGNOSIS — R10.13 ABDOMINAL PAIN, EPIGASTRIC: ICD-10-CM

## 2019-02-04 LAB
ALBUMIN SERPL-MCNC: 3.9 G/DL (ref 3.4–5)
ALBUMIN UR-MCNC: NEGATIVE MG/DL
ALP SERPL-CCNC: 79 U/L (ref 40–150)
ALT SERPL W P-5'-P-CCNC: 23 U/L (ref 0–70)
AMYLASE SERPL-CCNC: 44 U/L (ref 30–110)
ANION GAP SERPL CALCULATED.3IONS-SCNC: 10 MMOL/L (ref 3–14)
APPEARANCE UR: ABNORMAL
AST SERPL W P-5'-P-CCNC: 25 U/L (ref 0–45)
BACTERIA #/AREA URNS HPF: ABNORMAL /HPF
BILIRUB SERPL-MCNC: 0.6 MG/DL (ref 0.2–1.3)
BILIRUB UR QL STRIP: ABNORMAL
BUN SERPL-MCNC: 24 MG/DL (ref 7–30)
CALCIUM SERPL-MCNC: 8.5 MG/DL (ref 8.5–10.1)
CHLORIDE SERPL-SCNC: 102 MMOL/L (ref 94–109)
CO2 SERPL-SCNC: 25 MMOL/L (ref 20–32)
COLOR UR AUTO: ABNORMAL
CREAT SERPL-MCNC: 0.95 MG/DL (ref 0.66–1.25)
GFR SERPL CREATININE-BSD FRML MDRD: >90 ML/MIN/{1.73_M2}
GLUCOSE SERPL-MCNC: 96 MG/DL (ref 70–99)
GLUCOSE UR STRIP-MCNC: NEGATIVE MG/DL
HGB UR QL STRIP: NEGATIVE
KETONES UR STRIP-MCNC: NEGATIVE MG/DL
LEUKOCYTE ESTERASE UR QL STRIP: NEGATIVE
LIPASE SERPL-CCNC: 80 U/L (ref 73–393)
MUCOUS THREADS #/AREA URNS LPF: PRESENT /LPF
NITRATE UR QL: NEGATIVE
PH UR STRIP: 5.5 PH (ref 5–7)
POTASSIUM SERPL-SCNC: 3.7 MMOL/L (ref 3.4–5.3)
PROT SERPL-MCNC: 6.9 G/DL (ref 6.8–8.8)
RBC #/AREA URNS AUTO: ABNORMAL /HPF
SODIUM SERPL-SCNC: 137 MMOL/L (ref 133–144)
SOURCE: ABNORMAL
SP GR UR STRIP: >1.03 (ref 1–1.03)
UROBILINOGEN UR STRIP-ACNC: 1 EU/DL (ref 0.2–1)
WBC #/AREA URNS AUTO: ABNORMAL /HPF

## 2019-02-04 PROCEDURE — 83690 ASSAY OF LIPASE: CPT | Performed by: FAMILY MEDICINE

## 2019-02-04 PROCEDURE — 99214 OFFICE O/P EST MOD 30 MIN: CPT | Performed by: FAMILY MEDICINE

## 2019-02-04 PROCEDURE — 74019 RADEX ABDOMEN 2 VIEWS: CPT

## 2019-02-04 PROCEDURE — 80053 COMPREHEN METABOLIC PANEL: CPT | Performed by: FAMILY MEDICINE

## 2019-02-04 PROCEDURE — 85025 COMPLETE CBC W/AUTO DIFF WBC: CPT | Performed by: FAMILY MEDICINE

## 2019-02-04 PROCEDURE — 82150 ASSAY OF AMYLASE: CPT | Performed by: FAMILY MEDICINE

## 2019-02-04 PROCEDURE — 81001 URINALYSIS AUTO W/SCOPE: CPT | Performed by: FAMILY MEDICINE

## 2019-02-04 PROCEDURE — 36415 COLL VENOUS BLD VENIPUNCTURE: CPT | Performed by: FAMILY MEDICINE

## 2019-02-04 ASSESSMENT — MIFFLIN-ST. JEOR: SCORE: 1701.08

## 2019-02-04 NOTE — PATIENT INSTRUCTIONS
Milk of magnesium  For constipation   I have written a prescription   Take a shot glass at bedtime (3 tablespoons)

## 2019-02-04 NOTE — PROGRESS NOTES
SUBJECTIVE:   Brie Elmore is a 28 year old male who presents to clinic today for the following health issues:      GERD/Heartburn  Onset: 1.5 weeks ago    Description:     Burning in chest: YES    Intensity: Not sure    Progression of Symptoms: same    Accompanying Signs & Symptoms:  Does it feel like food gets stuck: YES  Fever:  Yes - on/off  Nausea: YES  Vomiting (bloody?): No ; bupred and tasted acid in his mouth  Abdominal Pain: YES- Discomfort  Black-Tarry stools: no :  Bloody stools: no   Constipation: Yes    History:   Previous ulcers: no     Precipitating factors:   Caffeine use: YES  Alcohol use: no   NSAID/Aspirin use: YES- Aleve  Tobacco use: YES- Smokes  Worse with spicy foods.    Alleviating factors:  None    Problem list and histories reviewed & adjusted,   Reviewed and updated as needed this visit by clinical staff       Reviewed and updated as needed this visit by Provider       History reviewed. No pertinent past medical history.    Past Surgical History:   Procedure Laterality Date     ORTHOPEDIC SURGERY  2010    right hand fracture       Family History   Problem Relation Age of Onset     Hypertension Mother      Hypertension Father      Coronary Artery Disease Paternal Grandfather      Emphysema Brother        Social History     Tobacco Use     Smoking status: Current Every Day Smoker     Types: Cigarettes     Smokeless tobacco: Former User   Substance Use Topics     Alcohol use: No       He does not drink at all   No iv drug use   No street drug use   Denies opioids     Patient says temp up to 104 earlier this week and repeated over 102    Patient had shaking chills     No tick bites       Ros: no chest pain, chest tightness   No sob   No leg edema   No abdominal pain     Denies fever or chills   Weight stable     Denies sob     Heartburn is a new symptoms   Loss of appetite   Urine is dark     He works as a      O: /67 (BP Location: Right arm, Patient Position: Sitting, Cuff  "Size: Adult Large)   Pulse 74   Temp 99  F (37.2  C) (Oral)   Ht 1.778 m (5' 10\")   Wt 73.5 kg (162 lb)   SpO2 95%   BMI 23.24 kg/m      Head: Normocephalic, atraumatic.  Eyes: Conjunctiva clear, non icteric. PERRLA.  Ears: External ears and TMs normal BL.  Nose: Septum midline, nasal mucosa pink and moist. No discharge.  Mouth / Throat: Normal dentition.  No oral lesions. Pharynx non erythematous, tonsils without hypertrophy.  Neck: Supple, no enlarged LN, trachea midline.    Chest wall normal to inspection and palpation. Good excursion bilaterally. Lungs clear to auscultation. Good air movement bilaterally without rales, wheezes, or rhonchi.   Regular rate and  rhythm. S1 and S2 normal, no murmurs, clicks, gallops or rubs. No edema or JVD.    The abdomen is soft with mild mid epigastic tenderness,no  guarding, mass, rebound or organomegaly. Bowel sounds are normal. No CVA tenderness or inguinal adenopathy noted.      ICD-10-CM    1. Abdominal pain, epigastric R10.13 UA reflex to Microscopic and Culture     XR Abdomen 2 Views     CBC with platelets differential     Comprehensive metabolic panel     Amylase     Lipase     magnesium hydroxide (MILK OF MAGNESIA) 400 MG/5ML suspension     Urine Microscopic   2. Constipation, unspecified constipation type K59.00 magnesium hydroxide (MILK OF MAGNESIA) 400 MG/5ML suspension     Xray shows constipation   Patient was told to take MOM     Cbc is essentially normal       He also could take a suppository or even an enema to get things moving   If his pain increases and he spike another temp he might be better off going to the ER.  Patient agreed   ua is concentrated and shows small bilirubin     Patient does have a decreased appetite and abdominal pain   He could have some type of obstructive process in the liver or doubtfully hepatitis .      Await labs from tomorrow.             "

## 2019-02-05 LAB
DIFFERENTIAL METHOD BLD: ABNORMAL
EOSINOPHIL # BLD AUTO: 0.1 10E9/L (ref 0–0.7)
EOSINOPHIL NFR BLD AUTO: 1 %
ERYTHROCYTE [DISTWIDTH] IN BLOOD BY AUTOMATED COUNT: 11.2 % (ref 10–15)
HCT VFR BLD AUTO: 39.3 % (ref 40–53)
HGB BLD-MCNC: 13.7 G/DL (ref 13.3–17.7)
LYMPHOCYTES # BLD AUTO: 1.9 10E9/L (ref 0.8–5.3)
LYMPHOCYTES NFR BLD AUTO: 28 %
MCH RBC QN AUTO: 30.6 PG (ref 26.5–33)
MCHC RBC AUTO-ENTMCNC: 34.9 G/DL (ref 31.5–36.5)
MCV RBC AUTO: 88 FL (ref 78–100)
MONOCYTES # BLD AUTO: 0.9 10E9/L (ref 0–1.3)
MONOCYTES NFR BLD AUTO: 13 %
NEUTROPHILS # BLD AUTO: 3.8 10E9/L (ref 1.6–8.3)
NEUTROPHILS NFR BLD AUTO: 58 %
PLATELET # BLD AUTO: 233 10E9/L (ref 150–450)
RBC # BLD AUTO: 4.48 10E12/L (ref 4.4–5.9)
WBC # BLD AUTO: 6.7 10E9/L (ref 4–11)

## 2019-02-05 NOTE — RESULT ENCOUNTER NOTE
All of your abdominal tests are normal   No signs of pancreatitis   No signs of liver problems   White cells and xrays were ok as previously noted     Problems are probably from the severe constipation

## 2019-10-25 DIAGNOSIS — E06.3 HYPOTHYROIDISM DUE TO HASHIMOTO'S THYROIDITIS: ICD-10-CM

## 2019-10-25 NOTE — TELEPHONE ENCOUNTER
Patient already given 30 day janel. Instructed to make appointment. Patient verbalized understanding via [employer].     Routing refill request to provider for review/approval because:  Patient needs to be seen because it has been more than 1 year since last office visit. Patient has not followed up.    Fanny Shukla, RN, BSN, PHN  [facility]: [place]

## 2019-10-25 NOTE — TELEPHONE ENCOUNTER
"Requested Prescriptions   Pending Prescriptions Disp Refills     levothyroxine (SYNTHROID/LEVOTHROID) 75 MCG tablet [Pharmacy Med Name: LEVOTHYROXINE 0.075MG (75MCG) TABS] 90 tablet 0     Sig: TAKE 1 TABLET BY MOUTH EVERY DAY   Last Written Prescription Date:  9-24-19  Last Fill Quantity: 30,  # refills: 0   Last office visit: 2/4/2019 with prescribing provider:  5-   Future Office Visit:        Thyroid Protocol Failed - 10/25/2019  3:18 AM        Failed - Normal TSH on file in past 12 months     Recent Labs   Lab Test 07/20/18  1453   TSH 3.47              Passed - Patient is 12 years or older        Passed - Recent (12 mo) or future (30 days) visit within the authorizing provider's specialty     Patient has had an office visit with the authorizing provider or a provider within the authorizing providers department within the previous 12 mos or has a future within next 30 days. See \"Patient Info\" tab in inbasket, or \"Choose Columns\" in Meds & Orders section of the refill encounter.              Passed - Medication is active on med list          "

## 2019-10-28 RX ORDER — LEVOTHYROXINE SODIUM 75 UG/1
TABLET ORAL
Qty: 30 TABLET | Refills: 0 | Status: SHIPPED | OUTPATIENT
Start: 2019-10-28 | End: 2019-11-28

## 2019-10-29 ENCOUNTER — OFFICE VISIT - HEALTHEAST (OUTPATIENT)
Dept: FAMILY MEDICINE | Facility: CLINIC | Age: 29
End: 2019-10-29

## 2019-10-29 DIAGNOSIS — E03.9 HYPOTHYROIDISM, UNSPECIFIED TYPE: ICD-10-CM

## 2019-10-29 LAB — TSH SERPL DL<=0.005 MIU/L-ACNC: 5.26 UIU/ML (ref 0.3–5)

## 2019-10-29 ASSESSMENT — MIFFLIN-ST. JEOR: SCORE: 1770.17

## 2019-10-30 LAB — T4 FREE SERPL-MCNC: 0.7 NG/DL (ref 0.7–1.8)

## 2019-10-31 ENCOUNTER — COMMUNICATION - HEALTHEAST (OUTPATIENT)
Dept: FAMILY MEDICINE | Facility: CLINIC | Age: 29
End: 2019-10-31

## 2019-10-31 DIAGNOSIS — E03.9 HYPOTHYROIDISM, UNSPECIFIED TYPE: ICD-10-CM

## 2019-11-28 DIAGNOSIS — E06.3 HYPOTHYROIDISM DUE TO HASHIMOTO'S THYROIDITIS: ICD-10-CM

## 2019-11-29 RX ORDER — LEVOTHYROXINE SODIUM 75 UG/1
TABLET ORAL
Qty: 10 TABLET | Refills: 0 | Status: SHIPPED | OUTPATIENT
Start: 2019-11-29

## 2019-11-29 NOTE — TELEPHONE ENCOUNTER
"Requested Prescriptions   Pending Prescriptions Disp Refills     levothyroxine (SYNTHROID/LEVOTHROID) 75 MCG tablet [Pharmacy Med Name: LEVOTHYROXINE 0.075MG (75MCG) TABS] 30 tablet 0     Sig: TAKE 1 TABLET BY MOUTH EVERY DAY   Last Written Prescription Date:  10-28-19  Last Fill Quantity: 30,  # refills: 0   Last office visit: 2/4/2019 with prescribing provider:  -5-18   Future Office Visit:        Thyroid Protocol Failed - 11/28/2019  3:17 AM        Failed - Normal TSH on file in past 12 months     Recent Labs   Lab Test 07/20/18  1453   TSH 3.47              Passed - Patient is 12 years or older        Passed - Recent (12 mo) or future (30 days) visit within the authorizing provider's specialty     Patient has had an office visit with the authorizing provider or a provider within the authorizing providers department within the previous 12 mos or has a future within next 30 days. See \"Patient Info\" tab in inbasket, or \"Choose Columns\" in Meds & Orders section of the refill encounter.              Passed - Medication is active on med list          "

## 2019-11-29 NOTE — TELEPHONE ENCOUNTER
Routing refill request to provider for review/approval because:  Carito given x2 and patient did not follow up, please advise    Fanny Shukla RN, BSN, PHN  [facility]: [place]

## 2019-11-29 NOTE — TELEPHONE ENCOUNTER
TC contacted patient and communicated message below. Patient states he is establishing care with a new provider as he has moved to [place].

## 2019-12-02 ENCOUNTER — COMMUNICATION - HEALTHEAST (OUTPATIENT)
Dept: LAB | Facility: CLINIC | Age: 29
End: 2019-12-02

## 2019-12-02 DIAGNOSIS — E07.9 THYROID DISEASE: ICD-10-CM

## 2019-12-03 ENCOUNTER — AMBULATORY - HEALTHEAST (OUTPATIENT)
Dept: LAB | Facility: CLINIC | Age: 29
End: 2019-12-03

## 2019-12-03 DIAGNOSIS — E07.9 THYROID DISEASE: ICD-10-CM

## 2019-12-03 LAB — TSH SERPL DL<=0.005 MIU/L-ACNC: 1.62 UIU/ML (ref 0.3–5)

## 2019-12-05 ENCOUNTER — COMMUNICATION - HEALTHEAST (OUTPATIENT)
Dept: FAMILY MEDICINE | Facility: CLINIC | Age: 29
End: 2019-12-05

## 2020-03-11 ENCOUNTER — HEALTH MAINTENANCE LETTER (OUTPATIENT)
Age: 30
End: 2020-03-11

## 2020-10-16 ENCOUNTER — COMMUNICATION - HEALTHEAST (OUTPATIENT)
Dept: FAMILY MEDICINE | Facility: CLINIC | Age: 30
End: 2020-10-16

## 2020-10-16 DIAGNOSIS — E03.9 HYPOTHYROIDISM, UNSPECIFIED TYPE: ICD-10-CM

## 2020-10-19 ENCOUNTER — COMMUNICATION - HEALTHEAST (OUTPATIENT)
Dept: FAMILY MEDICINE | Facility: CLINIC | Age: 30
End: 2020-10-19

## 2020-10-19 DIAGNOSIS — E03.9 HYPOTHYROIDISM, UNSPECIFIED TYPE: ICD-10-CM

## 2020-11-23 ENCOUNTER — COMMUNICATION - HEALTHEAST (OUTPATIENT)
Dept: FAMILY MEDICINE | Facility: CLINIC | Age: 30
End: 2020-11-23

## 2020-11-23 DIAGNOSIS — E03.9 HYPOTHYROIDISM, UNSPECIFIED TYPE: ICD-10-CM

## 2020-11-27 ENCOUNTER — COMMUNICATION - HEALTHEAST (OUTPATIENT)
Dept: SCHEDULING | Facility: CLINIC | Age: 30
End: 2020-11-27

## 2020-12-22 ENCOUNTER — COMMUNICATION - HEALTHEAST (OUTPATIENT)
Dept: FAMILY MEDICINE | Facility: CLINIC | Age: 30
End: 2020-12-22

## 2020-12-22 DIAGNOSIS — E03.9 HYPOTHYROIDISM, UNSPECIFIED TYPE: ICD-10-CM

## 2020-12-27 ENCOUNTER — HEALTH MAINTENANCE LETTER (OUTPATIENT)
Age: 30
End: 2020-12-27

## 2021-04-25 ENCOUNTER — HEALTH MAINTENANCE LETTER (OUTPATIENT)
Age: 31
End: 2021-04-25

## 2021-06-02 NOTE — PROGRESS NOTES
HPI:  Brie Elmore is a 29 y.o. male who is seen for   Chief Complaint   Patient presents with     Medication Check     Levothyroxine    Brie Elmore is seen for recheck of hypothyroidism, states he has not ran out of this medication, is currently on 75 mcg daily.  He was told if he needs refills that he must establish care, he was seen by a previous provider in [place].  He denies palpitations, chest pain, dysphasia, globus, dry skin or dry hair.  He works as a  and does note that he has very dry hands at times.  He has a previous history of smoking, quit 8 months ago.   ROS: Patient denies fever, chills, sweats, fainting, fatigue, weight change, dizziness, sleep problems, chest pain, palpitations, shortness of breath, wheezing, cough,  sore throat, changes in hearing, ear pain,tinnitus,  disphagia, sore throat, globus, changes in vision, eye pain eye redness, acid reflux, nausea, vomiting, diarrhea, constipation, black or bloody stools,  Dysuria, frequency, urinary incontinence, nocturia, hematuria, back pain,joint pain, bone pain, muscle cramps,edema, weakness, numbness, tingling of extremities, rash, itching, skin changes, swollen lymph nodes, thirst, increased urination, breast lumps, breast pain, nipple discharge, memory difficulties, anxiety, mood swings, (female)vaginal discharge, dyspareunia, menorrhagia, pelvic pain, sexual dysfunction,   (male) testicular lumps, erectile dysfunction.    No results found for: HGBA1C  No results found for: GLUF, MICROALBUR, LDLCALC, CREATININE  Patient Active Problem List   Diagnosis     History of opioid abuse (H)     Hypothyroidism due to Hashimoto's thyroiditis     No family history on file.  Social History     Socioeconomic History     Marital status: Single     Spouse name: None     Number of children: None     Years of education: None     Highest education level: None   Occupational History     None   Social Needs     Financial resource strain: None      Food insecurity:     Worry: None     Inability: None     Transportation needs:     Medical: None     Non-medical: None   Tobacco Use     Smoking status: Former Smoker     Packs/day: 0.00     Smokeless tobacco: Never Used   Substance and Sexual Activity     Alcohol use: None     Drug use: None     Sexual activity: None   Lifestyle     Physical activity:     Days per week: None     Minutes per session: None     Stress: None   Relationships     Social connections:     Talks on phone: None     Gets together: None     Attends Orthodox service: None     Active member of club or organization: None     Attends meetings of clubs or organizations: None     Relationship status: None     Intimate partner violence:     Fear of current or ex partner: None     Emotionally abused: None     Physically abused: None     Forced sexual activity: None   Other Topics Concern     None   Social History Narrative     None     No past surgical history on file.  Current Outpatient Medications on File Prior to Visit   Medication Sig Dispense Refill     multivitamin with minerals (THERA M PLUS, FERROUS FUMARAT,) 9 mg iron-400 mcg Tab tablet Take 1 tablet by mouth.       SUBOXONE 8-2 mg Film per sublingual film TK 2 AND 1/4 FILM DAILY  0     No current facility-administered medications on file prior to visit.      Allergies   Allergen Reactions     Azithromycin Hives     Cat Hair Standardized Allergenic Extract      Dog Dander        I have reviewed the patient's medical history in detail and updated the computerized patient record.  OBJECTIVE:  Wt Readings from Last 3 Encounters:   10/29/19 172 lb 7 oz (78.2 kg)     Temp Readings from Last 3 Encounters:   No data found for Temp     BP Readings from Last 3 Encounters:   10/29/19 122/70     Pulse Readings from Last 3 Encounters:   No data found for Pulse     Body mass index is 23.39 kg/m .     Alert, cooperative, well-hydrated. Appears well.  Eyes: Pupils equal, round, reactive to light.  HEENT:  Sclera white, nares patent, MMM, TM's pearly bilaterally,Neck: supple, without lymphadenopathy, Thyroid freely movable and without hypotrophy or nodularity.   Lungs: Clear to auscultation. No retractions, no increased work of respiration, equal chest rise.   Heart: Regular rate and rhythm, no murmurs, clicks,   Gallops.  Abdomen: Soft, bowel sounds in 4 quadrants with no tenderness to palpation, no organomegaly or masses, no aortic or renal bruits.  Extremities: no tenderness to palpation of gastrocnemius, bilaterally.  Skin: no increased warmth, edema, or erythema of lower legs bilaterally.  Back: No cervical, thoracic or lumbar tenderness to spinous processes or musculature.  Neuro::pupils equal and reactive to light bilaterally, CN II - XII grossly intact. No focal motor/sensory deficits.    Mood: PHQ 2 has no feelings of depression or hopelessness, does have several days where he has lack of interest in doing things he once enjoyed.  Mood: Good eye contact, no pressured speech, no psychomotor agitation, no suicidal or homicidal ideations.  Labs:  No results found for any previous visit.     ASSESMENT/PLAN:  1. Hypothyroidism, unspecified type  levothyroxine (SYNTHROID, LEVOTHROID) 75 MCG tablet    Thyroid [place]    Thyroid [place]    T4, Free   1.  We will restart his levothyroxine at 75 mcg, will recheck thyroid and call him with results.  Follow-up in 1 year for physical.  Addendum patient was called with increase in levothyroxine 25 mcg in addition to his 75 mcg he will need to follow-up in 6 weeks for recheck of TSH.  Juliane Yan, MS, PA-C 11/03/19

## 2021-06-02 NOTE — TELEPHONE ENCOUNTER
----- Message from Juliane Yan PA-C sent at 10/31/2019 11:33 AM CDT -----  TSH is still moderately elevated, will send 25 mcg levothyroxine to add to your 75 mcg tablets to equal a dose of 100 mcg daily.  Return in 6 weeks for recheck of TSH.  Please schedule lab only visit, please call results to the patient or send a letter if not reachable by phone.

## 2021-06-02 NOTE — PROGRESS NOTES
TSH is still moderately elevated, will send 25 mcg levothyroxine to add to your 75 mcg tablets to equal a dose of 100 mcg daily.  Return in 6 weeks for recheck of TSH.  Please schedule lab only visit, please call results to the patient or send a letter if not reachable by phone.

## 2021-06-03 VITALS
DIASTOLIC BLOOD PRESSURE: 70 MMHG | WEIGHT: 172.44 LBS | BODY MASS INDEX: 23.36 KG/M2 | HEIGHT: 72 IN | SYSTOLIC BLOOD PRESSURE: 122 MMHG

## 2021-06-03 NOTE — TELEPHONE ENCOUNTER
Phoned patient, wanted to set up [employer] sent him the code and will activate so that he can schedule appointments. Asked if I could schedule patient did not know when he was going to be available so did not want me to. Will schedule on [employer], told patient if any issues please call to schedule appointments. Will get medication from his pharmacy and will take 100mcg. No further questions at this time.

## 2021-06-04 NOTE — PROGRESS NOTES
Thyroid tests are normal, please call results to the patient or send a letter if not reachable by phone.

## 2021-06-04 NOTE — TELEPHONE ENCOUNTER
----- Message from Juliane Yan PA-C sent at 12/5/2019 10:47 AM CST -----  Thyroid tests are normal, please call results to the patient or send a letter if not reachable by phone.

## 2021-06-12 NOTE — TELEPHONE ENCOUNTER
Last Office Visit  10/29/2019 Juliane Yan PA-C  Notes:  1. Hypothyroidism, unspecified type  levothyroxine (SYNTHROID, LEVOTHROID) 75 MCG tablet     Thyroid Amity     Thyroid Amity     T4, Free   1.  We will restart his levothyroxine at 75 mcg, will recheck thyroid and call him with results.  Follow-up in 1 year for physical.  Addendum patient was called with increase in levothyroxine 25 mcg in addition to his 75 mcg he will need to follow-up in 6 weeks for recheck of TSH.      Last Filled:  levothyroxine (SYNTHROID, LEVOTHROID) 25 MCG tablet 90 tablet 3 10/31/2019  --   Si mcg to add to current 75 mcg tabs to equal 100 mcg daily.   Sent to pharmacy as: levothyroxine 25 mcg tablet (SYNTHROID, LEVOTHROID)   E-Prescribing Status: Receipt confirmed by pharmacy (10/31/2019 11:35 AM CDT)       Next OV:  Visit date not found        Medication teed up for provider signature

## 2021-06-12 NOTE — TELEPHONE ENCOUNTER
1 month rx signed. Needs appointment before additional refills.    Please assist patient in scheduling  appointment.

## 2021-06-12 NOTE — TELEPHONE ENCOUNTER
RN cannot approve Refill Request    RN can NOT refill this medication No established PCP. Last office visit: 10/29/2019 Juliane Yan PA-C Last Physical: Visit date not found Last MTM visit: Visit date not found Last visit same specialty: 10/29/2019 Juliane Yan PA-C.  Next visit within 3 mo: Visit date not found  Next physical within 3 mo: Visit date not found      Nelly Kinney, [facility] Triage/Med Refill 10/17/2020    Requested Prescriptions   Pending Prescriptions Disp Refills     levothyroxine (SYNTHROID, LEVOTHROID) 25 MCG tablet [Pharmacy Med Name: LEVOTHYROXINE 0.025MG (25MCG) TAB] 90 tablet 3     Sig: TAKE 1 TABLET BY MOUTH DAILY ALONG WITH THE 75 MCG TABLET       Thyroid Hormones Protocol Passed - 10/16/2020  8:55 AM        Passed - Provider visit in past 12 months or next 3 months     Last office visit with prescriber/PCP: 10/29/2019 Juliane Yan PA-C OR same dept: 10/29/2019 Juliane Yan PA-C OR same specialty: 10/29/2019 Juliane Yan PA-C  Last physical: Visit date not found Last MTM visit: Visit date not found   Next visit within 3 mo: Visit date not found  Next physical within 3 mo: Visit date not found  Prescriber OR PCP: Juliane Yan PA-C  Last diagnosis associated with med order: 1. Hypothyroidism, unspecified type  - levothyroxine (SYNTHROID, LEVOTHROID) 25 MCG tablet [Pharmacy Med Name: LEVOTHYROXINE 0.025MG (25MCG) TAB]; TAKE 1 TABLET BY MOUTH DAILY ALONG WITH THE 75 MCG TABLET  Dispense: 90 tablet; Refill: 3    If protocol passes may refill for 12 months if within 3 months of last provider visit (or a total of 15 months).             Passed - TSH on file in past 12 months for patient age 12 & older     TSH   Date Value Ref Range Status   12/03/2019 1.62 0.30 - 5.00 uIU/mL Final

## 2021-06-13 NOTE — TELEPHONE ENCOUNTER
RN triage   Call from [place] -- no signed consent -- pt gave verbal consent   Pt has been vomiting for 12 hrs -- many time   No blood -- not sure if bile  No fever   Also having acid reflux and dry heaves   Feeling weak   Per protocol = should go to ED   [place] will take pt to [facility] ED   Magalie Pina RN BAN [facility] RN triage      COVID 19 Nurse Triage Plan/Patient Instructions    Please be aware that novel coronavirus (COVID-19) may be circulating in the community. If you develop symptoms such as fever, cough, or SOB or if you have concerns about the presence of another infection including coronavirus (COVID-19), please contact your health care provider or visit [email or web address].     Disposition/Instructions    ED Visit recommended. Follow protocol based instructions.      Bring Your Own Device:  Please also bring your smart device(s) (smart phones, tablets, laptops) and their charging cables for your personal use and to communicate with your care team during your visit.      Thank you for taking steps to prevent the spread of this virus.  o Limit your contact with others.  o Wear a simple mask to cover your cough.  o Wash your hands well and often.    Resources    [facility]: About COVID-19: [email or web address]    CDC: What to Do If You're Sick: www.cdc.gov/coronavirus/2019-ncov/about/steps-when-sick.html    CDC: Ending Home Isolation: www.cdc.gov/coronavirus/2019-ncov/hcp/disposition-in-home-patients.html     CDC: Caring for Someone: www.cdc.gov/coronavirus/2019-ncov/if-you-are-sick/care-for-someone.html     [facility]: Interim Guidance for Hospital Discharge to Home: www.health.[facility]placed by [facility].[place]/diseases/coronavirus/hcp/hospdischarge.pdf    [facility] clinical trials (COVID-19 research studies): clinicalaffairs.[facility]/umn-clinical-trials     Below are the COVID-19 hotlines at the [place] Department of Health ([facility]). Interpreters are available.   o For health questions: Call  825.870.8661 or 1-156.834.6029 (7 a.m. to 7 p.m.)  o For questions about schools and childcare: Call 542-619-2423 or 1-528.402.7711 (7 a.m. to 7 p.m.)       Reason for Disposition    SEVERE vomiting (e.g., 6 or more times/day)    Additional Information    Negative: Shock suspected (e.g., cold/pale/clammy skin, too weak to stand, low BP, rapid pulse)    Negative: Difficult to awaken or acting confused (e.g., disoriented, slurred speech)    Negative: Sounds like a life-threatening emergency to the triager    Negative: Headache is main symptom    Negative: Chest pain    Protocols used: VOMITING-A-OH

## 2021-06-14 NOTE — TELEPHONE ENCOUNTER
Lab Results   Component Value Date    TSH 1.62 12/03/2019     Last filled 10/29/2019    Called pt and LVM that pt is due for appointment with PCP -mailed letter to address on file

## 2021-06-14 NOTE — TELEPHONE ENCOUNTER
RN cannot approve Refill Request    RN can NOT refill this medication No established PCP. Last office visit: 10/29/2019 Juliane Yan PA-C Last Physical: Visit date not found Last MTM visit: Visit date not found Last visit same specialty: 10/29/2019 Juliane Yan PA-C.  Next visit within 3 mo: Visit date not found  Next physical within 3 mo: Visit date not found      Nelly Kinney, [facility] Triage/Med Refill 12/24/2020    Requested Prescriptions   Pending Prescriptions Disp Refills     levothyroxine (SYNTHROID, LEVOTHROID) 75 MCG tablet [Pharmacy Med Name: LEVOTHYROXINE 0.075MG (75MCG) TABS] 90 tablet 3     Sig: TAKE 1 TABLET(75 MCG) BY MOUTH DAILY       Thyroid Hormones Protocol Failed - 12/22/2020  3:23 AM        Failed - Provider visit in past 12 months or next 3 months     Last office visit with prescriber/PCP: 10/29/2019 Juliane Yan PA-C OR same dept: Visit date not found OR same specialty: 10/29/2019 Juliane Yan PA-C  Last physical: Visit date not found Last MTM visit: Visit date not found   Next visit within 3 mo: Visit date not found  Next physical within 3 mo: Visit date not found  Prescriber OR PCP: Juliane Yan PA-C  Last diagnosis associated with med order: 1. Hypothyroidism, unspecified type  - levothyroxine (SYNTHROID, LEVOTHROID) 75 MCG tablet [Pharmacy Med Name: LEVOTHYROXINE 0.075MG (75MCG) TABS]; TAKE 1 TABLET(75 MCG) BY MOUTH DAILY  Dispense: 90 tablet; Refill: 3    If protocol passes may refill for 12 months if within 3 months of last provider visit (or a total of 15 months).             Failed - TSH on file in past 12 months for patient age 12 & older     TSH   Date Value Ref Range Status   12/03/2019 1.62 0.30 - 5.00 uIU/mL Final

## 2021-06-19 NOTE — LETTER
Letter by Juliane Yan PA-C at      Author: Juliane Yan PA-C Service: -- Author Type: --    Filed:  Encounter Date: 12/5/2019 Status: Signed         Brie Elmore  [address] [facility] [address]             December 5, 2019         Dear Minerva Ramírez,    Below are the results from your recent visit:    Resulted Orders   Thyroid Stimulating Hormone (TSH)   Result Value Ref Range    TSH 1.62 0.30 - 5.00 uIU/mL        Thyroid tests are normal.    Please call with questions or contact us using [employer].    Sincerely,        Electronically signed by Juliane Yan PA-C

## 2021-06-21 NOTE — LETTER
Letter by Juliane Yan PA-C at      Author: Juliane Yan PA-C Service: -- Author Type: --    Filed:  Encounter Date: 10/16/2020 Status: (Other)       Brie Elmore  [address]  [facility] [address]      10/22/20      Dear Brie,      In reviewing your records, we have determined a medication check is needed before your next refill, please call the [facility] to schedule an appointment.      Medication check/review within the next 30 days      We have made attempts to call you for an appointment, please verify your contact information is correct when calling back for an appointment, or if you have transferred your care to another clinic, please contact us so we can update our records.     Please call [phone] to schedule an appointment.    We believe that a strong preventative care program, including regular physicals and follow-up care is an important part of a healthy lifestyle and we are committed to helping you maintain your health.    Thank you for choosing us as your health care provider.    Sincerely,    Paula Goldsmith CMA - BETI/CA  [facility]  [address] [address]  [phone]

## 2021-06-21 NOTE — LETTER
Letter by Juliane Yan PA-C at      Author: Juliane Yan PA-C Service: -- Author Type: --    Filed:  Encounter Date: 12/22/2020 Status: (Other)         Brie Elmore  [address]  [facility] [address]                 December 24, 2020       Dear Mr. Elmore,    We have received your medical message.  Effective September 10, 2020, Juliane Yan PA-C, will no longer see patients at [facility]. Our clinic staff is dedicated to helping you make a smooth transition to a new healthcare provider. The following providers at our clinic would be happy to partner with you for your  healthcare needs:    ? Mitchel Coulter MD Family Medicine  ? Ramon Velasco MD   ? Juan F Santiago MD  ? Jessika Holliday NP Internal Medicine  ? Renetta [place] MD Internal Medicine/Pediatrics  ? Magalie Cannon NP Pediatrics  ? Elena Patterson MD Pediatrics  ? Milana Bush MD Family Medicine    Please note: If you currently have a prescription for medication and have no refills remaining, you need to establish care with a new provider before getting your prescription refilled.      If you have any questions or want more information about providers at our clinic or other [facility], please visit [email or web address] or call [phone] (toll-free). Thank you for choosing [facility] for your medical care.    I wish you and your family the very best of health.    Sincerely,    Paula Goldsmith CMA - BETI/CA  [facility]  [address] [address]  [phone]             Please call with questions or contact us using [employer].      Sincerely,        Electronically signed by Juliane Yan PA-C

## 2021-10-09 ENCOUNTER — HEALTH MAINTENANCE LETTER (OUTPATIENT)
Age: 31
End: 2021-10-09

## 2022-05-21 ENCOUNTER — HEALTH MAINTENANCE LETTER (OUTPATIENT)
Age: 32
End: 2022-05-21

## 2022-09-17 ENCOUNTER — HEALTH MAINTENANCE LETTER (OUTPATIENT)
Age: 32
End: 2022-09-17

## 2023-01-04 NOTE — TELEPHONE ENCOUNTER
LVM to patient, asked to call back to the clinic to get in detail results.     If patient calls back please relay message and schedule for a labs only appointment.    TSH is still moderately elevated, will send 25 mcg levothyroxine to add to your 75 mcg tablets to equal a dose of 100 mcg daily.  Return in 6 weeks for recheck of TSH.  Please schedule lab only visit,    If patient does not call back will mail out a letter.   CONSTITUTIONAL: Chronically ill appearing, in no apparent distress.  ENT: Airway patent, dry mucous membranes.   EYES: Pupils equal, round and reactive to light. EOMI. Conjunctiva normal appearing.   CARDIAC: Normal rate, regular rhythm.  Heart sounds S1, S2.    RESPIRATORY: Breath sounds clear and equal bilaterally.   GASTROINTESTINAL: Abdomen soft, non-tender, not distended.  MUSCULOSKELETAL: No obvious deformities. +L hip TTP. No swelling. No R hip pain.  +Trace LE edema.   NEUROLOGICAL: Alert.  SKIN: Skin normal color, warm. CONSTITUTIONAL: Chronically ill appearing, in no apparent distress.  ENT: Airway patent, dry mucous membranes.   EYES: Pupils equal, round and reactive to light. EOMI. Conjunctiva normal appearing.   CARDIAC: Normal rate, regular rhythm.  Heart sounds S1, S2.    RESPIRATORY: Breath sounds clear and equal bilaterally.   GASTROINTESTINAL: Abdomen soft, non-tender, not distended.  MUSCULOSKELETAL: No obvious deformities. +L hip TTP. No swelling. No R hip pain.  +Trace LE edema.   NEUROLOGICAL: AOx1- baseline per wife.  SKIN: Skin normal color, warm. CONSTITUTIONAL: Chronically ill appearing, in no apparent distress.  ENT: Airway patent, dry mucous membranes.   EYES: Pupils equal, round and reactive to light. EOMI. Conjunctiva normal appearing.   CARDIAC: Normal rate, regular rhythm.  Heart sounds S1, S2.    RESPIRATORY: Breath sounds clear and equal bilaterally.   GASTROINTESTINAL: Abdomen soft, non-tender, not distended.  MUSCULOSKELETAL: No obvious deformities. +L hip TTP. No swelling. No R hip pain.  +Trace LE edema.   NEUROLOGICAL: AOx1- baseline per wife.  SKIN: Skin normal color, warm.     Attn - alert, nad, head - NC/AT, no facial tenderness, mandible and TMJ are NT, tongue - no trauma, very dry mm.  PERRL 2 mm, IOL OS, nose - NT, no deformity or signs of epistaxis, neck/spine/back - NT, Chest/ribs - NT, PPM left upper chest,  Lungs - clear, good BS bilaterally without splinting, Cor - RR, no M, Abdo - soft, NT, ND, no HSM or tenderness, no ecchymosis or signs of trauma, no CVAT, Pelvis/hips - incision R hip clean and dry with staples in place,   tender Left hip with ER of leg,  UExt - FROM without pain, NT, LExt - bilat knee swelling and pitting edema of lower leg bilat, + distal pulses,  Neuro - confused, limited, but CN, motor, sensory, cerebellar, speech appear intact and non focal

## 2023-06-04 ENCOUNTER — HEALTH MAINTENANCE LETTER (OUTPATIENT)
Age: 33
End: 2023-06-04

## 2024-12-03 ENCOUNTER — HOSPITAL ENCOUNTER (EMERGENCY)
Facility: CLINIC | Age: 34
Discharge: HOME | End: 2024-12-03
Attending: EMERGENCY MEDICINE | Admitting: EMERGENCY MEDICINE
Payer: COMMERCIAL

## 2024-12-03 ENCOUNTER — APPOINTMENT (OUTPATIENT)
Dept: CT IMAGING | Facility: CLINIC | Age: 34
End: 2024-12-03
Attending: EMERGENCY MEDICINE
Payer: COMMERCIAL

## 2024-12-03 ENCOUNTER — OFFICE VISIT (OUTPATIENT)
Dept: URGENT CARE | Facility: URGENT CARE | Age: 34
End: 2024-12-03
Payer: COMMERCIAL

## 2024-12-03 VITALS
DIASTOLIC BLOOD PRESSURE: 74 MMHG | HEART RATE: 102 BPM | BODY MASS INDEX: 22.38 KG/M2 | OXYGEN SATURATION: 97 % | TEMPERATURE: 97.4 F | SYSTOLIC BLOOD PRESSURE: 152 MMHG | WEIGHT: 165 LBS | RESPIRATION RATE: 18 BRPM

## 2024-12-03 VITALS
SYSTOLIC BLOOD PRESSURE: 134 MMHG | DIASTOLIC BLOOD PRESSURE: 87 MMHG | TEMPERATURE: 99.1 F | RESPIRATION RATE: 20 BRPM | OXYGEN SATURATION: 98 % | HEART RATE: 107 BPM

## 2024-12-03 DIAGNOSIS — R10.813 RIGHT LOWER QUADRANT ABDOMINAL TENDERNESS WITHOUT REBOUND TENDERNESS: Primary | ICD-10-CM

## 2024-12-03 DIAGNOSIS — K35.30 ACUTE APPENDICITIS WITH LOCALIZED PERITONITIS, WITHOUT PERFORATION, ABSCESS, OR GANGRENE: ICD-10-CM

## 2024-12-03 LAB
ANION GAP SERPL CALCULATED.3IONS-SCNC: 9 MMOL/L (ref 7–15)
BASOPHILS # BLD AUTO: 0.1 10E3/UL (ref 0–0.2)
BASOPHILS NFR BLD AUTO: 1 %
BUN SERPL-MCNC: 20.1 MG/DL (ref 6–20)
CALCIUM SERPL-MCNC: 8.8 MG/DL (ref 8.8–10.4)
CHLORIDE SERPL-SCNC: 102 MMOL/L (ref 98–107)
CREAT SERPL-MCNC: 0.73 MG/DL (ref 0.67–1.17)
EGFRCR SERPLBLD CKD-EPI 2021: >90 ML/MIN/1.73M2
EOSINOPHIL # BLD AUTO: 0.2 10E3/UL (ref 0–0.7)
EOSINOPHIL NFR BLD AUTO: 2 %
ERYTHROCYTE [DISTWIDTH] IN BLOOD BY AUTOMATED COUNT: 11.7 % (ref 10–15)
GLUCOSE SERPL-MCNC: 119 MG/DL (ref 70–99)
HCO3 SERPL-SCNC: 25 MMOL/L (ref 22–29)
HCT VFR BLD AUTO: 43 % (ref 40–53)
HGB BLD-MCNC: 14.5 G/DL (ref 13.3–17.7)
IMM GRANULOCYTES # BLD: 0 10E3/UL
IMM GRANULOCYTES NFR BLD: 0 %
LYMPHOCYTES # BLD AUTO: 1.1 10E3/UL (ref 0.8–5.3)
LYMPHOCYTES NFR BLD AUTO: 12 %
MCH RBC QN AUTO: 30.2 PG (ref 26.5–33)
MCHC RBC AUTO-ENTMCNC: 33.7 G/DL (ref 31.5–36.5)
MCV RBC AUTO: 90 FL (ref 78–100)
MONOCYTES # BLD AUTO: 0.4 10E3/UL (ref 0–1.3)
MONOCYTES NFR BLD AUTO: 4 %
NEUTROPHILS # BLD AUTO: 7.2 10E3/UL (ref 1.6–8.3)
NEUTROPHILS NFR BLD AUTO: 81 %
NRBC # BLD AUTO: 0 10E3/UL
NRBC BLD AUTO-RTO: 0 /100
PLATELET # BLD AUTO: 220 10E3/UL (ref 150–450)
POTASSIUM SERPL-SCNC: 4.2 MMOL/L (ref 3.4–5.3)
RBC # BLD AUTO: 4.8 10E6/UL (ref 4.4–5.9)
SODIUM SERPL-SCNC: 136 MMOL/L (ref 135–145)
WBC # BLD AUTO: 8.9 10E3/UL (ref 4–11)

## 2024-12-03 PROCEDURE — 250N000011 HC RX IP 250 OP 636: Performed by: EMERGENCY MEDICINE

## 2024-12-03 PROCEDURE — 96374 THER/PROPH/DIAG INJ IV PUSH: CPT | Mod: 59

## 2024-12-03 PROCEDURE — 74177 CT ABD & PELVIS W/CONTRAST: CPT

## 2024-12-03 PROCEDURE — 36415 COLL VENOUS BLD VENIPUNCTURE: CPT | Performed by: EMERGENCY MEDICINE

## 2024-12-03 PROCEDURE — 85018 HEMOGLOBIN: CPT | Performed by: EMERGENCY MEDICINE

## 2024-12-03 PROCEDURE — 250N000013 HC RX MED GY IP 250 OP 250 PS 637: Performed by: EMERGENCY MEDICINE

## 2024-12-03 PROCEDURE — 80048 BASIC METABOLIC PNL TOTAL CA: CPT | Performed by: EMERGENCY MEDICINE

## 2024-12-03 PROCEDURE — 250N000009 HC RX 250: Performed by: EMERGENCY MEDICINE

## 2024-12-03 PROCEDURE — 85041 AUTOMATED RBC COUNT: CPT | Performed by: EMERGENCY MEDICINE

## 2024-12-03 PROCEDURE — 85025 COMPLETE CBC W/AUTO DIFF WBC: CPT | Performed by: EMERGENCY MEDICINE

## 2024-12-03 PROCEDURE — 99285 EMERGENCY DEPT VISIT HI MDM: CPT | Mod: 25

## 2024-12-03 PROCEDURE — 82310 ASSAY OF CALCIUM: CPT | Performed by: EMERGENCY MEDICINE

## 2024-12-03 PROCEDURE — 82565 ASSAY OF CREATININE: CPT | Performed by: EMERGENCY MEDICINE

## 2024-12-03 RX ORDER — IOPAMIDOL 755 MG/ML
83 INJECTION, SOLUTION INTRAVASCULAR ONCE
Status: COMPLETED | OUTPATIENT
Start: 2024-12-03 | End: 2024-12-03

## 2024-12-03 RX ORDER — POLYETHYLENE GLYCOL 3350 17 G/17G
1 POWDER, FOR SOLUTION ORAL DAILY
Qty: 510 G | Refills: 0 | Status: SHIPPED | OUTPATIENT
Start: 2024-12-03 | End: 2024-12-04

## 2024-12-03 RX ORDER — KETOROLAC TROMETHAMINE 15 MG/ML
15 INJECTION, SOLUTION INTRAMUSCULAR; INTRAVENOUS ONCE
Status: COMPLETED | OUTPATIENT
Start: 2024-12-03 | End: 2024-12-03

## 2024-12-03 RX ORDER — BUPRENORPHINE HYDROCHLORIDE, NALOXONE HYDROCHLORIDE 8; 2 MG/1; MG/1
1 FILM, SOLUBLE BUCCAL; SUBLINGUAL ONCE
COMMUNITY
Start: 2024-11-11 | End: 2024-12-04

## 2024-12-03 RX ORDER — CEFAZOLIN SODIUM 2 G/100ML
2 INJECTION, SOLUTION INTRAVENOUS
Status: CANCELLED | OUTPATIENT
Start: 2024-12-03

## 2024-12-03 RX ORDER — ACETAMINOPHEN 325 MG/1
975 TABLET ORAL ONCE
Status: CANCELLED | OUTPATIENT
Start: 2024-12-03 | End: 2024-12-03

## 2024-12-03 RX ORDER — CEFAZOLIN SODIUM 2 G/100ML
2 INJECTION, SOLUTION INTRAVENOUS SEE ADMIN INSTRUCTIONS
Status: CANCELLED | OUTPATIENT
Start: 2024-12-03

## 2024-12-03 RX ADMIN — AMOXICILLIN AND CLAVULANATE POTASSIUM 1 TABLET: 875; 125 TABLET, FILM COATED ORAL at 18:56

## 2024-12-03 RX ADMIN — KETOROLAC TROMETHAMINE 15 MG: 15 INJECTION, SOLUTION INTRAMUSCULAR; INTRAVENOUS at 15:21

## 2024-12-03 RX ADMIN — IOPAMIDOL 83 ML: 755 INJECTION, SOLUTION INTRAVENOUS at 16:39

## 2024-12-03 RX ADMIN — SODIUM CHLORIDE 64 ML: 9 INJECTION, SOLUTION INTRAVENOUS at 16:40

## 2024-12-03 ASSESSMENT — COLUMBIA-SUICIDE SEVERITY RATING SCALE - C-SSRS
6. HAVE YOU EVER DONE ANYTHING, STARTED TO DO ANYTHING, OR PREPARED TO DO ANYTHING TO END YOUR LIFE?: NO
1. IN THE PAST MONTH, HAVE YOU WISHED YOU WERE DEAD OR WISHED YOU COULD GO TO SLEEP AND NOT WAKE UP?: NO
2. HAVE YOU ACTUALLY HAD ANY THOUGHTS OF KILLING YOURSELF IN THE PAST MONTH?: NO

## 2024-12-03 ASSESSMENT — ACTIVITIES OF DAILY LIVING (ADL)
ADLS_ACUITY_SCORE: 41

## 2024-12-03 NOTE — ED PROVIDER NOTES
Emergency Department Note      History of Present Illness     Chief Complaint   Abdominal Pain      HPI   Brie Elmore is a 34 year old male with a history of anxiety presenting with right upper quadrant pain. His symptom onset this morning when he woke up. His pain worsens with exertion or movements. He takes levothyroxine for his hypothyroidism. The patient denies constipation. He does not have pertinent medical issues.     Independent Historian   None    Review of External Notes       Past Medical History     Medical History and Problem List   Anxiety   Chemical dependency   Prediabtes    Medications   levothyroxine  suboxone     Surgical History   Right hand fracture repair    Physical Exam     Patient Vitals for the past 24 hrs:   BP Temp Temp src Pulse Resp SpO2 Weight   12/03/24 1316 (!) 152/74 97.4  F (36.3  C) Temporal 102 18 97 % 74.8 kg (165 lb)     Physical Exam  Vitals reviewed.   HENT:      Head: Normocephalic.   Eyes:      General: No scleral icterus.  Cardiovascular:      Rate and Rhythm: Normal rate.   Pulmonary:      Effort: Pulmonary effort is normal.   Abdominal:      Tenderness: There is abdominal tenderness in the right lower quadrant.      Hernia: No hernia is present.   Skin:     General: Skin is warm.      Capillary Refill: Capillary refill takes less than 2 seconds.   Neurological:      General: No focal deficit present.      Mental Status: He is alert.   Psychiatric:         Mood and Affect: Mood normal.           Diagnostics     Lab Results   Labs Ordered and Resulted from Time of ED Arrival to Time of ED Departure   BASIC METABOLIC PANEL - Abnormal       Result Value    Sodium 136      Potassium 4.2      Chloride 102      Carbon Dioxide (CO2) 25      Anion Gap 9      Urea Nitrogen 20.1 (*)     Creatinine 0.73      GFR Estimate >90      Calcium 8.8      Glucose 119 (*)    CBC WITH PLATELETS AND DIFFERENTIAL    WBC Count 8.9      RBC Count 4.80      Hemoglobin 14.5      Hematocrit 43.0       MCV 90      MCH 30.2      MCHC 33.7      RDW 11.7      Platelet Count 220      % Neutrophils 81      % Lymphocytes 12      % Monocytes 4      % Eosinophils 2      % Basophils 1      % Immature Granulocytes 0      NRBCs per 100 WBC 0      Absolute Neutrophils 7.2      Absolute Lymphocytes 1.1      Absolute Monocytes 0.4      Absolute Eosinophils 0.2      Absolute Basophils 0.1      Absolute Immature Granulocytes 0.0      Absolute NRBCs 0.0         Imaging   CT Abdomen Pelvis w Contrast   Final Result   IMPRESSION:    1.  Acute appendicitis.          Independent Interpretation   None    ED Course      Medications Administered   Medications - No data to display    Procedures   Procedures     Discussion of Management   Surgery, Dr. Dodson called back at 7 PM after 3 pages and initial page was at 542.    ED Course   ED Course as of 12/03/24 1901 Tue Dec 03, 2024   1514 I obtained the history and examined the patient as noted above.     7203 I rechecked the patient and explained findings.          Additional Documentation  None    Medical Decision Making / Diagnosis     CMS Diagnoses: None    MIPS       None    [facility]   Brie Elmore is a 34 year old male who presents with right lower quadrant pain.  Examination is suspicious for early appendicitis due to referred right lower quadrant pain with palpation to the left as well as focal right lower quadrant McBurney's point tenderness.  CT recommended due to early appendicitis and was positive.  Care was delayed due to patient boarding and crowding in the emergency room inability to get patient a room patient sat in the lobby for over 2 hours.  I did call the surgeon at 545.  The surgeon did not call me back till 7 PM.  An hour and 15 minutes after the first call.  Patient became quite impatient and wanted to go home as he had other things to do.  I did discuss with the patient the concerns about appendicitis and need for appendectomy.  Due to patient's early appendicitis  and normal lab work felt comfortable with a dose of oral antibiotics letting him go home and return first thing in the morning and n.p.o. after midnight.  Patient seemed to understand this and was comfortable with the discharge plan and return in the morning after he dealt with his own personal issues for elective appendectomy.  Care was eventually discussed with Dr. Dodson.    Disposition   The patient was discharged.     Diagnosis     ICD-10-CM    1. Acute appendicitis with localized peritonitis, without perforation, abscess, or gangrene  K35.30            Discharge Medications   New Prescriptions    No medications on file         Scribe Disclosure:  I, Lupe Teague, am serving as a scribe at 3:19 PM on 12/3/2024 to document services personally performed by Ron Cash MD based on my observations and the provider's statements to me.        Ron Cash MD  12/03/24 2447

## 2024-12-03 NOTE — PROGRESS NOTES
Chief Complaint   Patient presents with    Urgent Care     Pain started suddenly this morning in RUQ to midline- 7/10 pain-        ASSESSMENT/PLAN:  Brie was seen today for urgent care.    Diagnoses and all orders for this visit:    Right lower quadrant abdominal tenderness without rebound tenderness    Sudden onset of right lower quadrant tenderness with guarding and symptoms worse with walking and jumping off table.  Should be evaluated higher level of care.  Sending over to the ER for further evaluation management    Jaswinder Mcdowell PA-C      SUBJECTIVE:  Brie is a 34 year old male who presents to urgent care with sudden onset of right lower quadrant abdominal pain.  No nausea, vomiting.  No diarrhea.  Crunching and walking make it worse.    ROS: Pertinent ROS neg other than the symptoms noted above in the HPI.     OBJECTIVE:  /87   Pulse 107   Temp 99.1  F (37.3  C) (Tympanic)   Resp 20   SpO2 98%    GENERAL: alert and no distress  ABDOMEN: soft, right lower quadrant tenderness with guarding, mild left lower quadrant tenderness with slight guarding.  Pain worse with jumping off table    DIAGNOSTICS    No results found for any visits on 12/03/24.     Current Outpatient Medications   Medication Sig Dispense Refill    levothyroxine (SYNTHROID/LEVOTHROID) 75 MCG tablet TAKE 1 TABLET BY MOUTH EVERY DAY 10 tablet 0    multivitamin, therapeutic with minerals (MULTI-VITAMIN) TABS tablet Take 1 tablet by mouth daily      SUBOXONE 8-2 MG per film Place 1 Film under the tongue once.       No current facility-administered medications for this visit.      Patient Active Problem List   Diagnosis    History of opioid abuse (H)    Hypothyroidism due to Hashimoto's thyroiditis      No past medical history on file.  Past Surgical History:   Procedure Laterality Date    ORTHOPEDIC SURGERY  2010    right hand fracture     Family History   Problem Relation Age of Onset    Hypertension Mother     Hypertension Father      Coronary Artery Disease Paternal Grandfather     Emphysema Brother     Heart Disease Maternal Grandfather     Hypertension Other      Social History     Tobacco Use    Smoking status: Former     Types: Cigarettes    Smokeless tobacco: Never   Substance Use Topics    Alcohol use: No              The plan of care was discussed with the patient. They understand and agree with the course of treatment prescribed. A printed summary was given including instructions and medications.  The use of Dragon/[employer] dictation services may have been used to construct the content in this note; any grammatical or spelling errors are non-intentional. Please contact the author of this note directly if you are in need of any clarification.

## 2024-12-04 ENCOUNTER — HOSPITAL ENCOUNTER (OUTPATIENT)
Facility: CLINIC | Age: 34
End: 2024-12-04
Attending: SURGERY | Admitting: SURGERY
Payer: COMMERCIAL

## 2024-12-04 ENCOUNTER — TELEPHONE (OUTPATIENT)
Dept: SURGERY | Facility: CLINIC | Age: 34
End: 2024-12-04
Payer: COMMERCIAL

## 2024-12-04 ENCOUNTER — HOSPITAL ENCOUNTER (OUTPATIENT)
Facility: CLINIC | Age: 34
Setting detail: OBSERVATION
Discharge: HOME | End: 2024-12-05
Attending: EMERGENCY MEDICINE
Payer: COMMERCIAL

## 2024-12-04 DIAGNOSIS — K35.30 ACUTE APPENDICITIS WITH LOCALIZED PERITONITIS, WITHOUT PERFORATION, ABSCESS, OR GANGRENE: ICD-10-CM

## 2024-12-04 DIAGNOSIS — G89.18 ACUTE POST-OPERATIVE PAIN: Primary | ICD-10-CM

## 2024-12-04 PROCEDURE — 96365 THER/PROPH/DIAG IV INF INIT: CPT

## 2024-12-04 PROCEDURE — 250N000011 HC RX IP 250 OP 636: Performed by: EMERGENCY MEDICINE

## 2024-12-04 PROCEDURE — 250N000011 HC RX IP 250 OP 636: Performed by: SURGERY

## 2024-12-04 PROCEDURE — G0378 HOSPITAL OBSERVATION PER HR: HCPCS

## 2024-12-04 PROCEDURE — 96376 TX/PRO/DX INJ SAME DRUG ADON: CPT

## 2024-12-04 PROCEDURE — 99285 EMERGENCY DEPT VISIT HI MDM: CPT | Mod: 25

## 2024-12-04 PROCEDURE — 96375 TX/PRO/DX INJ NEW DRUG ADDON: CPT

## 2024-12-04 RX ORDER — NALOXONE HYDROCHLORIDE 0.4 MG/ML
0.2 INJECTION, SOLUTION INTRAMUSCULAR; INTRAVENOUS; SUBCUTANEOUS
Status: DISCONTINUED | OUTPATIENT
Start: 2024-12-04 | End: 2024-12-05 | Stop reason: HOSPADM

## 2024-12-04 RX ORDER — NALOXONE HYDROCHLORIDE 0.4 MG/ML
0.4 INJECTION, SOLUTION INTRAMUSCULAR; INTRAVENOUS; SUBCUTANEOUS
Status: DISCONTINUED | OUTPATIENT
Start: 2024-12-04 | End: 2024-12-05 | Stop reason: HOSPADM

## 2024-12-04 RX ORDER — SODIUM CHLORIDE, SODIUM LACTATE, POTASSIUM CHLORIDE, CALCIUM CHLORIDE 600; 310; 30; 20 MG/100ML; MG/100ML; MG/100ML; MG/100ML
INJECTION, SOLUTION INTRAVENOUS CONTINUOUS
Status: DISCONTINUED | OUTPATIENT
Start: 2024-12-05 | End: 2024-12-05 | Stop reason: HOSPADM

## 2024-12-04 RX ORDER — AMOXICILLIN 250 MG
2 CAPSULE ORAL 2 TIMES DAILY PRN
Status: DISCONTINUED | OUTPATIENT
Start: 2024-12-04 | End: 2024-12-05 | Stop reason: HOSPADM

## 2024-12-04 RX ORDER — HYDROMORPHONE HCL IN WATER/PF 6 MG/30 ML
0.2 PATIENT CONTROLLED ANALGESIA SYRINGE INTRAVENOUS
Status: DISCONTINUED | OUTPATIENT
Start: 2024-12-04 | End: 2024-12-05 | Stop reason: HOSPADM

## 2024-12-04 RX ORDER — KETOROLAC TROMETHAMINE 15 MG/ML
15 INJECTION, SOLUTION INTRAMUSCULAR; INTRAVENOUS EVERY 6 HOURS PRN
Status: DISCONTINUED | OUTPATIENT
Start: 2024-12-04 | End: 2024-12-05 | Stop reason: HOSPADM

## 2024-12-04 RX ORDER — HEPARIN SODIUM 5000 [USP'U]/.5ML
5000 INJECTION, SOLUTION INTRAVENOUS; SUBCUTANEOUS EVERY 8 HOURS
Status: DISCONTINUED | OUTPATIENT
Start: 2024-12-04 | End: 2024-12-04

## 2024-12-04 RX ORDER — HYDROMORPHONE HCL IN WATER/PF 6 MG/30 ML
0.4 PATIENT CONTROLLED ANALGESIA SYRINGE INTRAVENOUS
Status: DISCONTINUED | OUTPATIENT
Start: 2024-12-04 | End: 2024-12-05 | Stop reason: HOSPADM

## 2024-12-04 RX ORDER — AMOXICILLIN 250 MG
1 CAPSULE ORAL 2 TIMES DAILY PRN
Status: DISCONTINUED | OUTPATIENT
Start: 2024-12-04 | End: 2024-12-05 | Stop reason: HOSPADM

## 2024-12-04 RX ORDER — PIPERACILLIN SODIUM, TAZOBACTAM SODIUM 4; .5 G/20ML; G/20ML
4.5 INJECTION, POWDER, LYOPHILIZED, FOR SOLUTION INTRAVENOUS ONCE
Status: COMPLETED | OUTPATIENT
Start: 2024-12-04 | End: 2024-12-04

## 2024-12-04 RX ORDER — CALCIUM CARBONATE 500 MG/1
1000 TABLET, CHEWABLE ORAL 4 TIMES DAILY PRN
Status: DISCONTINUED | OUTPATIENT
Start: 2024-12-04 | End: 2024-12-05 | Stop reason: HOSPADM

## 2024-12-04 RX ORDER — ONDANSETRON 2 MG/ML
4 INJECTION INTRAMUSCULAR; INTRAVENOUS EVERY 6 HOURS PRN
Status: DISCONTINUED | OUTPATIENT
Start: 2024-12-04 | End: 2024-12-05 | Stop reason: HOSPADM

## 2024-12-04 RX ORDER — LIDOCAINE 40 MG/G
CREAM TOPICAL
Status: DISCONTINUED | OUTPATIENT
Start: 2024-12-04 | End: 2024-12-05 | Stop reason: HOSPADM

## 2024-12-04 RX ORDER — BUPRENORPHINE AND NALOXONE 8; 2 MG/1; MG/1
3 FILM, SOLUBLE BUCCAL; SUBLINGUAL DAILY
COMMUNITY

## 2024-12-04 RX ORDER — PIPERACILLIN SODIUM, TAZOBACTAM SODIUM 4; .5 G/20ML; G/20ML
4.5 INJECTION, POWDER, LYOPHILIZED, FOR SOLUTION INTRAVENOUS EVERY 6 HOURS
Status: DISCONTINUED | OUTPATIENT
Start: 2024-12-04 | End: 2024-12-05 | Stop reason: HOSPADM

## 2024-12-04 RX ADMIN — PIPERACILLIN AND TAZOBACTAM 4.5 G: 4; .5 INJECTION, POWDER, FOR SOLUTION INTRAVENOUS at 12:17

## 2024-12-04 RX ADMIN — HYDROMORPHONE HYDROCHLORIDE 0.4 MG: 0.2 INJECTION, SOLUTION INTRAMUSCULAR; INTRAVENOUS; SUBCUTANEOUS at 19:35

## 2024-12-04 RX ADMIN — PIPERACILLIN AND TAZOBACTAM 4.5 G: 4; .5 INJECTION, POWDER, FOR SOLUTION INTRAVENOUS at 23:33

## 2024-12-04 RX ADMIN — PIPERACILLIN AND TAZOBACTAM 4.5 G: 4; .5 INJECTION, POWDER, FOR SOLUTION INTRAVENOUS at 18:33

## 2024-12-04 ASSESSMENT — ACTIVITIES OF DAILY LIVING (ADL)
ADLS_ACUITY_SCORE: 41
ADLS_ACUITY_SCORE: 41
ADLS_ACUITY_SCORE: 43
ADLS_ACUITY_SCORE: 41

## 2024-12-04 ASSESSMENT — COLUMBIA-SUICIDE SEVERITY RATING SCALE - C-SSRS
1. IN THE PAST MONTH, HAVE YOU WISHED YOU WERE DEAD OR WISHED YOU COULD GO TO SLEEP AND NOT WAKE UP?: NO
2. HAVE YOU ACTUALLY HAD ANY THOUGHTS OF KILLING YOURSELF IN THE PAST MONTH?: NO
6. HAVE YOU EVER DONE ANYTHING, STARTED TO DO ANYTHING, OR PREPARED TO DO ANYTHING TO END YOUR LIFE?: NO

## 2024-12-04 NOTE — ED PROVIDER NOTES
"  Emergency Department Note      History of Present Illness     Chief Complaint   Abdominal Pain and Appendicitis       HPI   Brie Elmore is a 34 year old male with history of substance use, Suboxone, hypothyroidism presents after being in the ER yesterday for abdominal pain and being diagnosed with appendicitis presents due to ongoing pain and presumptive plans for surgery.  He was seen here yesterday.  Symptoms started on Tuesday.  Patient needed to go home and attend to his dogs.  He has not anything to eat since midnight.  He did have some water and Gatorade this morning.  Denies any fevers or chills.    Independent Historian   None    Review of External Notes   Clinic notes, recent CT report    Past Medical History     Medical History and Problem List   History reviewed. No pertinent past medical history.    Medications   levothyroxine (SYNTHROID/LEVOTHROID) 75 MCG tablet  multivitamin, therapeutic with minerals (MULTI-VITAMIN) TABS tablet  polyethylene glycol (MIRALAX) 17 GM/Dose powder  SUBOXONE 8-2 MG per film        Surgical History   Past Surgical History:   Procedure Laterality Date    ORTHOPEDIC SURGERY  2010    right hand fracture       Physical Exam     Patient Vitals for the past 24 hrs:   BP Temp Temp src Pulse Resp SpO2 Height Weight   12/04/24 1144 130/78 97.6  F (36.4  C) Temporal 91 16 98 % 1.803 m (5' 11\") 74.8 kg (165 lb)     Physical Exam  GENERAL: well developed, pleasant  HEAD: atraumatic  EYES: pupils reactive, extraocular muscles intact, conjunctivae normal  ENT:  mucus membranes moist  NECK:  trachea midline, normal range of motion  RESPIRATORY: no tachypnea, breath sounds clear to auscultation   CVS: normal S1/S2, no murmurs, intact distal pulses  ABDOMEN: soft, mild tenderness RLQ, no rebound or guarding  MUSCULOSKELETAL: no deformities  SKIN: warm and dry, no acute rashes or ulceration  NEURO: GCS 15, cranial nerves intact, alert and oriented x3  PSYCH:  Mood/affect " normal      Diagnostics     Lab Results   Labs Ordered and Resulted from Time of ED Arrival to Time of ED Departure - No data to display    Imaging   No orders to display           Independent Interpretation   None    ED Course      Medications Administered   Medications   piperacillin-tazobactam (ZOSYN) 4.5 g vial to attach to  mL bag (4.5 g Intravenous $New Bag 12/4/24 3689)       Procedures   Procedures     Discussion of Management   Surgery, Dr. Weinberg    ED Course        Additional Documentation  None    Medical Decision Making / Diagnosis     CMS Diagnoses:  MIPS       None    MDM   Brie Elmore is a 34 year old male presents with recent diagnosis of appendicitis on CT.  He notes he went home as he needed to attend to things at home namely his dogs and returns for appendectomy.  Abdominal exam is fairly benign he does have tenderness in right lower quadrant but no concerns for perforation or abscess.  Spoke with Dr. Weinberg who knows of the patient came to see him.  Patient was post to be here early in the morning but unfortunately was unable to get here that soon and is not able to get to the operating room until tomorrow.  Patient will be admitted for observation with plans for surgery in the morning.  He was given a dose of IV Zosyn.    Disposition   The patient was admitted to the hospital.     Diagnosis     ICD-10-CM    1. Acute appendicitis with localized peritonitis, without perforation, abscess, or gangrene  K35.30            Discharge Medications   New Prescriptions    No medications on file         MD Juan Acuna Shaun L, MD  12/04/24 0096       Rell Martinez MD  12/04/24 2784

## 2024-12-04 NOTE — ED NOTES
"[facility]  ED Nurse Handoff Report    ED Chief complaint: Abdominal Pain and Appendicitis       ED Diagnosis:   Final diagnoses:   Acute appendicitis with localized peritonitis, without perforation, abscess, or gangrene       Code Status: pending discussion with admitting MD    Allergies:   Allergies   Allergen Reactions    Azithromycin Hives    Cat Hair Extract     Dogs        Patient Story: Pt seen last night and diagnosed with acute appendicitis, pt discharged last night with plans to return today for surgical intervention.  Has been NPO since midnight.      Focused Assessment:  A/Ox4. Afebrile. VSS. Room air. Ambulatory.    Treatments and/or interventions provided: abx  Patient's response to treatments and/or interventions: tolerated well    To be done/followed up on inpatient unit:  pending    Does this patient have any cognitive concerns?:  none    Activity level - Baseline/Home:  Independent  Activity Level - Current:   Independent    Patient's Preferred language: English   Needed?: No    Isolation: None  Infection: Not Applicable  Patient tested for COVID 19 prior to admission: NO  Bariatric?: No    Vital Signs:   Vitals:    12/04/24 1144   BP: 130/78   Pulse: 91   Resp: 16   Temp: 97.6  F (36.4  C)   TempSrc: Temporal   SpO2: 98%   Weight: 74.8 kg (165 lb)   Height: 1.803 m (5' 11\")       Cardiac Rhythm:     Was the PSS-3 completed:   Yes  What interventions are required if any?               Family Comments:   OBS brochure/video discussed/provided to patient/family: N/A              Name of person given brochure if not patient:               Relationship to patient:     For the majority of the shift this patient's behavior was Green.   Behavioral interventions performed were .    ED NURSE PHONE NUMBER: [phone]         "

## 2024-12-04 NOTE — PROGRESS NOTES
RECEIVING UNIT ED HANDOFF REVIEW    ED Nurse Handoff Report was reviewed by: Alicia Psator RN on December 4, 2024 at 5:37 PM

## 2024-12-04 NOTE — DISCHARGE INSTRUCTIONS
We have given you an oral dose of antibiotics.  Okay to go home and eat but return to the emergency room first thing in the morning do not eat after midnight as you will require surgery to remove your appendix tomorrow.

## 2024-12-04 NOTE — ED NOTES
Pt reports he parked over at the [facility]. Pt states he forgot his phone in his vehicle and will be returning shortly.

## 2024-12-04 NOTE — TELEPHONE ENCOUNTER
I called patient at 7:30 a.m. this morning. He stated that he would come in at 9 a.m to the ER for his appendix removal. Attempted to call patient again at 11: 00 a.m. and patient did not  the phone.    Adolfo Weinberg MD FACS  Trauma/Emergency/Critical Care Surgery

## 2024-12-04 NOTE — PHARMACY-ADMISSION MEDICATION HISTORY
Pharmacist Admission Medication History    Admission medication history is complete. The information provided in this note is only as accurate as the sources available at the time of the update.    Information Source(s): Patient, Clinic records, and CareEverywhere/SureScripts via in-person    Pertinent Information: none    Changes made to PTA medication list:  Added: None  Deleted: Levothyroxine and Miralax  Changed: Suboxone    Allergies reviewed with patient and updates made in EHR: yes    Medication History Completed By: eLvy Ruby RPH 12/4/2024 1:58 PM    PTA Med List   Medication Sig Last Dose/Taking    buprenorphine HCl-naloxone HCl (SUBOXONE) 8-2 MG per film Place 3 Film under the tongue daily. 12/4/2024 Morning    multivitamin, therapeutic with minerals (MULTI-VITAMIN) TABS tablet Take 1 tablet by mouth daily Past Week

## 2024-12-04 NOTE — PROGRESS NOTES
General Surgery Note:    Attempted to call patient to set up for outpatient surgery for removal of appendix. Unable to reach patient or patient's significant other x3. Unable to leave VM due to patietnt's VM box being full and SO number being discontinued. Will try again later tonight.    Que Dodson MD  12/03/24 7:14 PM      Update:     Brie Elmore is a 34 year old with early appendicitis that was discharged home by ED with one time dose of Augmentin and plan for return to the ED tomorrow am.  Attempted to call patient again (4th time) to set up outpatient surgery. Patient did not answer. Will just keep case in the depot for now with the day acute care surgeon and not add onto the schedule.    Que Dodson MD   12/03/24 9:56 PM

## 2024-12-04 NOTE — ED TRIAGE NOTES
Pt seen last night and diagnosed with acute appendicitis, pt discharged last night with plans to return today for surgical intervention.  Has been NPO since midnight.      Triage Assessment (Adult)       Row Name 12/04/24 1147          Triage Assessment    Airway WDL WDL        Respiratory WDL    Respiratory WDL WDL        Skin Circulation/Temperature WDL    Skin Circulation/Temperature WDL WDL        Cardiac WDL    Cardiac WDL WDL        Peripheral/Neurovascular WDL    Peripheral Neurovascular WDL WDL        Cognitive/Neuro/Behavioral WDL    Cognitive/Neuro/Behavioral WDL WDL

## 2024-12-04 NOTE — H&P
[facility]    History and Physical - General Surgery Service       Date of Admission:  12/4/2024    Assessment & Plan: Surgery   Brie Elmore is a 34 year old male admitted on 12/4/2024. I am not 100% convinced he has acute appendicitis. His pain has not worsened. He denies nausea or vomiting. He state he is hungry. He has no fevers. Nonetheless, given the concern of him getting lost to follow-up I think it would be best to remove the appendix to rule it out as a source of his right lower quadrant pain. Patient is amenable to this. I am told there is no ability to get his appendix removed this afternoon due to staffing levels by the OR. We will therefore plan on surgery at 7 am      I have discussed the risks and benefits of a laparoscopic appendectomy including infection, bleeding, and possible intra-abdominal injury. The patient is amenable to the procedure.         Diet:  Regular  DVT Prophylaxis: Heparin SQ  Kidd Catheter: Not present  Lines: None     Drains: None     Cardiac Monitoring: None  Code Status:  Full Code    Clinically Significant Risk Factors Present on Admission                                        Disposition Plan      Expected Discharge Date: 12/05/2024                   Adolfo Weinberg MD  [facility]  Non-urgent messages: Securely message with [employer] (more info)  Text page via [employer] Paging/Directory     ______________________________________________________________________    Chief Complaint   Abdominal pain    History is obtained from the patient    History of Present Illness   Brie Elmore is a 34 year old male who presented at 12/03 with right lower quadrant abdominal pain. He denies nausea or vomiting. Denies fever. CT scan showed dilated appendix at 8 cm. WBC normal. Patient was discharged with plans to present in the morning today for surgery. However, he did not come to the hospital until noon. He states his pain is unchanged  and points specifically to his right lower quadrant.      Past Medical History    History reviewed. No pertinent past medical history.    Past Surgical History   Past Surgical History:   Procedure Laterality Date    ORTHOPEDIC SURGERY  2010    right hand fracture       Prior to Admission Medications   Prior to Admission Medications   Prescriptions Last Dose Informant Patient Reported? Taking?   SUBOXONE 8-2 MG per film   Yes No   Sig: Place 1 Film under the tongue once.   levothyroxine (SYNTHROID/LEVOTHROID) 75 MCG tablet   No No   Sig: TAKE 1 TABLET BY MOUTH EVERY DAY   multivitamin, therapeutic with minerals (MULTI-VITAMIN) TABS tablet   Yes No   Sig: Take 1 tablet by mouth daily   polyethylene glycol (MIRALAX) 17 GM/Dose powder   No No   Sig: Take 17 g (1 Capful) by mouth daily.      Facility-Administered Medications: None            Social History   I have reviewed this patient's social history and updated it with pertinent information if needed.  Social History     Tobacco Use    Smoking status: Former     Types: Cigarettes    Smokeless tobacco: Never   Substance Use Topics    Alcohol use: No    Drug use: No         Family History   I have reviewed this patient's family history and updated it with pertinent information if needed.  Family History   Problem Relation Age of Onset    Hypertension Mother     Hypertension Father     Coronary Artery Disease Paternal Grandfather     Emphysema Brother     Heart Disease Maternal Grandfather     Hypertension Other          Allergies   Allergies   Allergen Reactions    Azithromycin Hives    Cat Hair Extract     Dogs         Physical Exam   Vital Signs: Temp: 97.6  F (36.4  C) Temp src: Temporal BP: 130/78 Pulse: 91   Resp: 16 SpO2: 98 %      Weight: 165 lbs 0 ozNo intake or output data in the 24 hours ending 12/04/24 1221  Gen: NAD  Neuro: AAO  Abd: soft, nontdistended, McBurney's point tenderness to deep palpation, no rebound, minimal guarding, + Rosving's sign    Medical  Decision Making       25 MINUTES SPENT BY ME on the date of service doing chart review, history, exam, documentation & further activities per the note.      Data     I have personally reviewed the following data over the past 24 hrs:    8.9  \   14.5   / 220     136 102 20.1 (H) /  119 (H)   4.2 25 0.73 \       Imaging results reviewed over the past 24 hrs: I have reviewed the imaging myself.  Recent Results (from the past 24 hours)   CT Abdomen Pelvis w Contrast    Narrative    EXAM: CT ABDOMEN PELVIS W CONTRAST  LOCATION: [facility]  DATE: 12/3/2024    INDICATION: eval for rlq pain, ealry appendicitis  COMPARISON: None.  TECHNIQUE: CT scan of the abdomen and pelvis was performed following injection of IV contrast. Multiplanar reformats were obtained. Dose reduction techniques were used.  CONTRAST: 83mL Isovue 370    FINDINGS:   LOWER CHEST: Normal.    HEPATOBILIARY: Normal.    PANCREAS: Normal.    SPLEEN: Normal.    ADRENAL GLANDS: Normal.    KIDNEYS/BLADDER: Normal.    BOWEL: The appendix appears mildly thickened and dilated measuring 8 mm maximal diameter. No significant periappendiceal inflammatory change although paucity of intrapelvic fat does limit sensitivity. No free air. No evidence for abscess. No obstruction.   Large amount of stool throughout the colon.    LYMPH NODES: Normal.    VASCULATURE: Normal.    PELVIC ORGANS: Normal.    MUSCULOSKELETAL: Normal.      Impression    IMPRESSION:   1.  Acute appendicitis.

## 2024-12-05 ENCOUNTER — ANESTHESIA (OUTPATIENT)
Dept: SURGERY | Facility: CLINIC | Age: 34
End: 2024-12-05
Payer: COMMERCIAL

## 2024-12-05 ENCOUNTER — ANESTHESIA EVENT (OUTPATIENT)
Dept: SURGERY | Facility: CLINIC | Age: 34
End: 2024-12-05
Payer: COMMERCIAL

## 2024-12-05 VITALS
DIASTOLIC BLOOD PRESSURE: 75 MMHG | TEMPERATURE: 97.6 F | BODY MASS INDEX: 23.1 KG/M2 | RESPIRATION RATE: 16 BRPM | WEIGHT: 165 LBS | OXYGEN SATURATION: 99 % | SYSTOLIC BLOOD PRESSURE: 118 MMHG | HEART RATE: 70 BPM | HEIGHT: 71 IN

## 2024-12-05 PROCEDURE — G0378 HOSPITAL OBSERVATION PER HR: HCPCS

## 2024-12-05 PROCEDURE — 258N000003 HC RX IP 258 OP 636: Performed by: SURGERY

## 2024-12-05 PROCEDURE — 250N000013 HC RX MED GY IP 250 OP 250 PS 637: Performed by: SURGERY

## 2024-12-05 PROCEDURE — 250N000011 HC RX IP 250 OP 636: Performed by: ANESTHESIOLOGY

## 2024-12-05 PROCEDURE — 88304 TISSUE EXAM BY PATHOLOGIST: CPT | Mod: TC | Performed by: SURGERY

## 2024-12-05 PROCEDURE — 250N000011 HC RX IP 250 OP 636: Performed by: SURGERY

## 2024-12-05 PROCEDURE — 710N000012 HC RECOVERY PHASE 2, PER MINUTE: Performed by: SURGERY

## 2024-12-05 PROCEDURE — 96376 TX/PRO/DX INJ SAME DRUG ADON: CPT

## 2024-12-05 PROCEDURE — 360N000076 HC SURGERY LEVEL 3, PER MIN: Performed by: SURGERY

## 2024-12-05 PROCEDURE — 272N000001 HC OR GENERAL SUPPLY STERILE: Performed by: SURGERY

## 2024-12-05 PROCEDURE — 250N000011 HC RX IP 250 OP 636: Performed by: STUDENT IN AN ORGANIZED HEALTH CARE EDUCATION/TRAINING PROGRAM

## 2024-12-05 PROCEDURE — 96375 TX/PRO/DX INJ NEW DRUG ADDON: CPT

## 2024-12-05 PROCEDURE — 250N000009 HC RX 250: Performed by: SURGERY

## 2024-12-05 PROCEDURE — 370N000017 HC ANESTHESIA TECHNICAL FEE, PER MIN: Performed by: SURGERY

## 2024-12-05 PROCEDURE — 88304 TISSUE EXAM BY PATHOLOGIST: CPT | Mod: 26

## 2024-12-05 PROCEDURE — 250N000009 HC RX 250: Performed by: ANESTHESIOLOGY

## 2024-12-05 PROCEDURE — 710N000009 HC RECOVERY PHASE 1, LEVEL 1, PER MIN: Performed by: SURGERY

## 2024-12-05 PROCEDURE — 258N000001 HC RX 258: Performed by: SURGERY

## 2024-12-05 PROCEDURE — 258N000003 HC RX IP 258 OP 636: Performed by: ANESTHESIOLOGY

## 2024-12-05 PROCEDURE — 250N000025 HC SEVOFLURANE, PER MIN: Performed by: SURGERY

## 2024-12-05 PROCEDURE — 999N000141 HC STATISTIC PRE-PROCEDURE NURSING ASSESSMENT: Performed by: SURGERY

## 2024-12-05 PROCEDURE — 44970 LAPAROSCOPY APPENDECTOMY: CPT | Mod: GC | Performed by: SURGERY

## 2024-12-05 PROCEDURE — 250N000013 HC RX MED GY IP 250 OP 250 PS 637: Performed by: STUDENT IN AN ORGANIZED HEALTH CARE EDUCATION/TRAINING PROGRAM

## 2024-12-05 RX ORDER — DEXAMETHASONE SODIUM PHOSPHATE 4 MG/ML
INJECTION, SOLUTION INTRA-ARTICULAR; INTRALESIONAL; INTRAMUSCULAR; INTRAVENOUS; SOFT TISSUE PRN
Status: DISCONTINUED | OUTPATIENT
Start: 2024-12-05 | End: 2024-12-05

## 2024-12-05 RX ORDER — SODIUM CHLORIDE, SODIUM LACTATE, POTASSIUM CHLORIDE, CALCIUM CHLORIDE 600; 310; 30; 20 MG/100ML; MG/100ML; MG/100ML; MG/100ML
INJECTION, SOLUTION INTRAVENOUS CONTINUOUS
Status: DISCONTINUED | OUTPATIENT
Start: 2024-12-05 | End: 2024-12-05 | Stop reason: HOSPADM

## 2024-12-05 RX ORDER — FENTANYL CITRATE 50 UG/ML
25 INJECTION, SOLUTION INTRAMUSCULAR; INTRAVENOUS EVERY 5 MIN PRN
Status: DISCONTINUED | OUTPATIENT
Start: 2024-12-05 | End: 2024-12-05 | Stop reason: HOSPADM

## 2024-12-05 RX ORDER — ONDANSETRON 2 MG/ML
4 INJECTION INTRAMUSCULAR; INTRAVENOUS EVERY 30 MIN PRN
Status: DISCONTINUED | OUTPATIENT
Start: 2024-12-05 | End: 2024-12-05 | Stop reason: HOSPADM

## 2024-12-05 RX ORDER — HYDROMORPHONE HCL IN WATER/PF 6 MG/30 ML
0.4 PATIENT CONTROLLED ANALGESIA SYRINGE INTRAVENOUS EVERY 5 MIN PRN
Status: DISCONTINUED | OUTPATIENT
Start: 2024-12-05 | End: 2024-12-05 | Stop reason: HOSPADM

## 2024-12-05 RX ORDER — ONDANSETRON 2 MG/ML
INJECTION INTRAMUSCULAR; INTRAVENOUS PRN
Status: DISCONTINUED | OUTPATIENT
Start: 2024-12-05 | End: 2024-12-05

## 2024-12-05 RX ORDER — KETOROLAC TROMETHAMINE 30 MG/ML
INJECTION, SOLUTION INTRAMUSCULAR; INTRAVENOUS PRN
Status: DISCONTINUED | OUTPATIENT
Start: 2024-12-05 | End: 2024-12-05

## 2024-12-05 RX ORDER — HYDROMORPHONE HCL IN WATER/PF 6 MG/30 ML
0.2 PATIENT CONTROLLED ANALGESIA SYRINGE INTRAVENOUS EVERY 5 MIN PRN
Status: DISCONTINUED | OUTPATIENT
Start: 2024-12-05 | End: 2024-12-05 | Stop reason: HOSPADM

## 2024-12-05 RX ORDER — CEFAZOLIN SODIUM/WATER 2 G/20 ML
2 SYRINGE (ML) INTRAVENOUS SEE ADMIN INSTRUCTIONS
Status: DISCONTINUED | OUTPATIENT
Start: 2024-12-05 | End: 2024-12-05 | Stop reason: HOSPADM

## 2024-12-05 RX ORDER — BUPIVACAINE HYDROCHLORIDE AND EPINEPHRINE 5; 5 MG/ML; UG/ML
INJECTION, SOLUTION PERINEURAL PRN
Status: DISCONTINUED | OUTPATIENT
Start: 2024-12-05 | End: 2024-12-05 | Stop reason: HOSPADM

## 2024-12-05 RX ORDER — CEFAZOLIN SODIUM/WATER 2 G/20 ML
2 SYRINGE (ML) INTRAVENOUS
Status: COMPLETED | OUTPATIENT
Start: 2024-12-05 | End: 2024-12-05

## 2024-12-05 RX ORDER — DEXMEDETOMIDINE HYDROCHLORIDE 4 UG/ML
INJECTION, SOLUTION INTRAVENOUS PRN
Status: DISCONTINUED | OUTPATIENT
Start: 2024-12-05 | End: 2024-12-05

## 2024-12-05 RX ORDER — OXYCODONE HYDROCHLORIDE 5 MG/1
5 TABLET ORAL ONCE
Status: COMPLETED | OUTPATIENT
Start: 2024-12-05 | End: 2024-12-05

## 2024-12-05 RX ORDER — LIDOCAINE HYDROCHLORIDE 20 MG/ML
INJECTION, SOLUTION INFILTRATION; PERINEURAL PRN
Status: DISCONTINUED | OUTPATIENT
Start: 2024-12-05 | End: 2024-12-05

## 2024-12-05 RX ORDER — FENTANYL CITRATE 50 UG/ML
INJECTION, SOLUTION INTRAMUSCULAR; INTRAVENOUS PRN
Status: DISCONTINUED | OUTPATIENT
Start: 2024-12-05 | End: 2024-12-05

## 2024-12-05 RX ORDER — PROPOFOL 10 MG/ML
INJECTION, EMULSION INTRAVENOUS PRN
Status: DISCONTINUED | OUTPATIENT
Start: 2024-12-05 | End: 2024-12-05

## 2024-12-05 RX ORDER — ACETAMINOPHEN 325 MG/1
975 TABLET ORAL ONCE
Status: COMPLETED | OUTPATIENT
Start: 2024-12-05 | End: 2024-12-05

## 2024-12-05 RX ORDER — OXYCODONE HYDROCHLORIDE 5 MG/1
5 TABLET ORAL EVERY 4 HOURS PRN
Qty: 15 TABLET | Refills: 0 | Status: SHIPPED | OUTPATIENT
Start: 2024-12-05 | End: 2024-12-08

## 2024-12-05 RX ORDER — HEPARIN SODIUM 5000 [USP'U]/.5ML
5000 INJECTION, SOLUTION INTRAVENOUS; SUBCUTANEOUS ONCE
Status: COMPLETED | OUTPATIENT
Start: 2024-12-05 | End: 2024-12-05

## 2024-12-05 RX ORDER — DEXAMETHASONE SODIUM PHOSPHATE 4 MG/ML
4 INJECTION, SOLUTION INTRA-ARTICULAR; INTRALESIONAL; INTRAMUSCULAR; INTRAVENOUS; SOFT TISSUE
Status: DISCONTINUED | OUTPATIENT
Start: 2024-12-05 | End: 2024-12-05 | Stop reason: HOSPADM

## 2024-12-05 RX ORDER — ONDANSETRON 4 MG/1
4 TABLET, ORALLY DISINTEGRATING ORAL EVERY 30 MIN PRN
Status: DISCONTINUED | OUTPATIENT
Start: 2024-12-05 | End: 2024-12-05 | Stop reason: HOSPADM

## 2024-12-05 RX ORDER — AMOXICILLIN 250 MG
1 CAPSULE ORAL 2 TIMES DAILY
Qty: 15 TABLET | Refills: 0 | Status: SHIPPED | OUTPATIENT
Start: 2024-12-05

## 2024-12-05 RX ORDER — ACETAMINOPHEN 325 MG/1
650 TABLET ORAL
Status: DISCONTINUED | OUTPATIENT
Start: 2024-12-05 | End: 2024-12-05 | Stop reason: HOSPADM

## 2024-12-05 RX ORDER — NALOXONE HYDROCHLORIDE 0.4 MG/ML
0.1 INJECTION, SOLUTION INTRAMUSCULAR; INTRAVENOUS; SUBCUTANEOUS
Status: DISCONTINUED | OUTPATIENT
Start: 2024-12-05 | End: 2024-12-05 | Stop reason: HOSPADM

## 2024-12-05 RX ORDER — AMOXICILLIN 250 MG
1 CAPSULE ORAL 2 TIMES DAILY PRN
Qty: 15 TABLET | Refills: 0 | Status: SHIPPED | OUTPATIENT
Start: 2024-12-05 | End: 2024-12-05

## 2024-12-05 RX ORDER — FENTANYL CITRATE 50 UG/ML
50 INJECTION, SOLUTION INTRAMUSCULAR; INTRAVENOUS EVERY 5 MIN PRN
Status: DISCONTINUED | OUTPATIENT
Start: 2024-12-05 | End: 2024-12-05 | Stop reason: HOSPADM

## 2024-12-05 RX ADMIN — KETOROLAC TROMETHAMINE 30 MG: 30 INJECTION, SOLUTION INTRAMUSCULAR at 08:13

## 2024-12-05 RX ADMIN — ONDANSETRON 4 MG: 2 INJECTION INTRAMUSCULAR; INTRAVENOUS at 08:06

## 2024-12-05 RX ADMIN — Medication 2 G: at 07:35

## 2024-12-05 RX ADMIN — DEXMEDETOMIDINE HYDROCHLORIDE 12 MCG: 200 INJECTION INTRAVENOUS at 08:05

## 2024-12-05 RX ADMIN — KETOROLAC TROMETHAMINE 15 MG: 15 INJECTION, SOLUTION INTRAMUSCULAR; INTRAVENOUS at 04:29

## 2024-12-05 RX ADMIN — LIDOCAINE HYDROCHLORIDE 60 MG: 20 INJECTION, SOLUTION INFILTRATION; PERINEURAL at 07:35

## 2024-12-05 RX ADMIN — HYDROMORPHONE HYDROCHLORIDE 0.2 MG: 1 INJECTION, SOLUTION INTRAMUSCULAR; INTRAVENOUS; SUBCUTANEOUS at 07:55

## 2024-12-05 RX ADMIN — OXYCODONE HYDROCHLORIDE 5 MG: 5 TABLET ORAL at 09:09

## 2024-12-05 RX ADMIN — SODIUM CHLORIDE, POTASSIUM CHLORIDE, SODIUM LACTATE AND CALCIUM CHLORIDE: 600; 310; 30; 20 INJECTION, SOLUTION INTRAVENOUS at 07:43

## 2024-12-05 RX ADMIN — ROCURONIUM BROMIDE 50 MG: 50 INJECTION, SOLUTION INTRAVENOUS at 07:35

## 2024-12-05 RX ADMIN — SODIUM CHLORIDE, POTASSIUM CHLORIDE, SODIUM LACTATE AND CALCIUM CHLORIDE: 600; 310; 30; 20 INJECTION, SOLUTION INTRAVENOUS at 00:09

## 2024-12-05 RX ADMIN — HYDROMORPHONE HYDROCHLORIDE 0.3 MG: 1 INJECTION, SOLUTION INTRAMUSCULAR; INTRAVENOUS; SUBCUTANEOUS at 07:52

## 2024-12-05 RX ADMIN — PROPOFOL 200 MG: 10 INJECTION, EMULSION INTRAVENOUS at 07:35

## 2024-12-05 RX ADMIN — Medication 200 MG: at 08:16

## 2024-12-05 RX ADMIN — DEXMEDETOMIDINE HYDROCHLORIDE 8 MCG: 200 INJECTION INTRAVENOUS at 08:01

## 2024-12-05 RX ADMIN — MIDAZOLAM 2 MG: 1 INJECTION INTRAMUSCULAR; INTRAVENOUS at 07:24

## 2024-12-05 RX ADMIN — HEPARIN SODIUM 5000 UNITS: 5000 INJECTION, SOLUTION INTRAVENOUS; SUBCUTANEOUS at 07:18

## 2024-12-05 RX ADMIN — DEXAMETHASONE SODIUM PHOSPHATE 4 MG: 4 INJECTION, SOLUTION INTRA-ARTICULAR; INTRALESIONAL; INTRAMUSCULAR; INTRAVENOUS; SOFT TISSUE at 07:40

## 2024-12-05 RX ADMIN — ACETAMINOPHEN 975 MG: 325 TABLET, FILM COATED ORAL at 07:22

## 2024-12-05 RX ADMIN — PHENYLEPHRINE HYDROCHLORIDE 100 MCG: 10 INJECTION INTRAVENOUS at 07:44

## 2024-12-05 RX ADMIN — PIPERACILLIN AND TAZOBACTAM 4.5 G: 4; .5 INJECTION, POWDER, FOR SOLUTION INTRAVENOUS at 05:44

## 2024-12-05 RX ADMIN — FENTANYL CITRATE 100 MCG: 50 INJECTION INTRAMUSCULAR; INTRAVENOUS at 07:35

## 2024-12-05 ASSESSMENT — ACTIVITIES OF DAILY LIVING (ADL)
ADLS_ACUITY_SCORE: 43
ADLS_ACUITY_SCORE: 45
ADLS_ACUITY_SCORE: 45
ADLS_ACUITY_SCORE: 43
ADLS_ACUITY_SCORE: 43
ADLS_ACUITY_SCORE: 45
ADLS_ACUITY_SCORE: 43

## 2024-12-05 ASSESSMENT — ENCOUNTER SYMPTOMS: SEIZURES: 0

## 2024-12-05 ASSESSMENT — LIFESTYLE VARIABLES: TOBACCO_USE: 0

## 2024-12-05 NOTE — ANESTHESIA PROCEDURE NOTES
Airway       Patient location during procedure: OR       Procedure Start/Stop Times: 12/5/2024 7:37 AM  Staff -        CRNA: Marcelina Pierce APRN CRNA       Performed By: CRNA  Consent for Airway        Urgency: elective  Indications and Patient Condition       Indications for airway management: christi-procedural       Induction type:intravenous       Mask difficulty assessment: 1 - vent by mask    Final Airway Details       Final airway type: endotracheal airway       Successful airway: ETT - single and Oral  Endotracheal Airway Details        ETT size (mm): 8.0       Cuffed: yes       Successful intubation technique: video laryngoscopy       VL Blade Size: Persaud 4       Grade View of Cords: 1       Adjucts: stylet       Position: Right       Measured from: gums/teeth       Secured at (cm): 23       Bite block used: None    Post intubation assessment        Placement verified by: capnometry, equal breath sounds and chest rise        Number of attempts at approach: 1       Secured with: tape       Ease of procedure: easy       Dentition: Intact and Unchanged    Medication(s) Administered   Medication Administration Time: 12/5/2024 7:37 AM

## 2024-12-05 NOTE — PROGRESS NOTES
Meets criteria for discharge.  Discharge instructions reviewed with pt and pt's designated responsible party.  Pt label on prescription bag from pharmacy matched to pt's wristband. Pharmacy bag opened with 2 prescriptions inside. Medications were reviewed to match pt wristband while pt and significant other agreed with identification. Prescriptions placed back in pharmacy bag resealed with tape and placed in pt's bag with discharge instructions per pt request.

## 2024-12-05 NOTE — ANESTHESIA CARE TRANSFER NOTE
Patient: Brie Elmore    Procedure: Procedure(s):  APPENDECTOMY, LAPAROSCOPIC       Diagnosis: Acute appendicitis with localized peritonitis, without perforation, abscess, or gangrene [K35.30]  Diagnosis Additional Information: No value filed.    Anesthesia Type:   General     Note:    Oropharynx: oropharynx clear of all foreign objects and spontaneously breathing  Level of Consciousness: awake  Oxygen Supplementation: nasal cannula  Level of Supplemental Oxygen (L/min / FiO2): 2  Independent Airway: airway patency satisfactory and stable  Dentition: dentition unchanged  Vital Signs Stable: post-procedure vital signs reviewed and stable  Report to RN Given: handoff report given  Patient transferred to: PACU    Handoff Report: Identifed the Patient, Identified the Reponsible Provider, Reviewed the pertinent medical history, Discussed the surgical course, Reviewed Intra-OP anesthesia mangement and issues during anesthesia, Set expectations for post-procedure period and Allowed opportunity for questions and acknowledgement of understanding      Vitals:  Vitals Value Taken Time   /78 0830   Temp     Pulse 76 12/05/24 0830   Resp 8 12/05/24 0830   SpO2 100 % 12/05/24 0830   Vitals shown include unfiled device data.    Electronically Signed By: LAM Castle CRNA  December 5, 2024  8:31 AM

## 2024-12-05 NOTE — OP NOTE
OPERATIVE REPORT  Name: Brie Elmore  :  1990  MR#:   [id number]  Preop Dx:   Acute appendicitis  Postop Dx:   Same   Procedure Performed:   Laparoscopic appendectomy  Surgeon:   Adolfo Weinberg MD  Anesthesia:   General Endotracheal  Indication:  Please see my consultation note.  The patient was counseled for the potential benefits and risks of laparoscopic appendectomy.  Potential benefits discussed include partial or complete alleviation of symptoms and preservation of life.  Risks discussed include bleeding, infection or internal abscess, any of which could lead to the recommendation for more surgical procedures.  The patient states understanding that the risks are low and gives informed consent.    Findings:   Mildly inflamed, dilated appendix    Operative Technique:    The patient was brought to the operating room and placed in supine position. General anesthesia was induced and an endrotracheal tube was inserted. The patient was prepped and draped in a sterile fashion and a proper timeout was performed.  An infraumbilical incision was made with sharp dissection down to the fasica. A Veress needle was placed into the abdomen and gas insufflation was performed up to 12 mmHg. The OptiView technique was then used to enter the abdomen with the 5 mm trocar. Two other ports were placed under direct laparoscopic visualization, one 11-mm trocar in left lower quadrant and one 5-mm at below at the suprapubic area.    The patient was placed in right side up and in slight Trendelenburg position, allowing the abdominal contents to shift cephalad and to the left side.  This allowed for identification of the appendix and its attachments.  The appendix was noted to be mildly inflamed with dilated appendix at the tip.      Using the Ligasure, the retroperitoneal and the other surrounding attachments and adhesions were freed. The appendix was then grasped at the mesoappendix and the appendiceal mesentery exposed.   The base of the appendix was exposed. Two Endoloops were placed at the base of the appendix. The appendix was then transected with the LigaSure. The appendix was then placed in an endoscopic retrieval bag and extracted from the abdomen through the 11-mm port.  Pneumoperitoneum was re-established and the cecum was surveyed for hemostasis, which was adequate.  The abdomen was surveyed again with no apparent findings of injury to the bowel, viscus, or vessels. Pneumoperitoneum was then evacuated. The 11 mm port was closed with 0-Vicryl suture.  All port site skin incisions were closed with 4.O Vicryl subcuticularly.  Mastisol and Steristrips were applied. Pt. tolerated the procedure well without complications.    Estimated Blood Loss:  10 ml    Specimens: Appendix, sent to Pathology for routine evaluation.  Adolfo Weinberg MD FACS

## 2024-12-05 NOTE — ANESTHESIA POSTPROCEDURE EVALUATION
Patient: Brie Elmore    Procedure: Procedure(s):  APPENDECTOMY, LAPAROSCOPIC       Anesthesia Type:  General    Note:  Disposition: Outpatient   Postop Pain Control: Uneventful            Sign Out: Well controlled pain   PONV: No   Neuro/Psych: Uneventful            Sign Out: Acceptable/Baseline neuro status   Airway/Respiratory: Uneventful            Sign Out: Acceptable/Baseline resp. status   CV/Hemodynamics: Uneventful            Sign Out: Acceptable CV status; No obvious hypovolemia; No obvious fluid overload   Other NRE: NONE   DID A NON-ROUTINE EVENT OCCUR? No           Last vitals:  Vitals Value Taken Time   /79 12/05/24 0900   Temp 36.5  C (97.7  F) 12/05/24 0915   Pulse 74 12/05/24 0915   Resp 12 12/05/24 0915   SpO2 99 % 12/05/24 0915       Electronically Signed By: Bryan Singer MD  December 5, 2024  11:06 AM

## 2024-12-05 NOTE — PROGRESS NOTES
Patient seen and evaluated in pre-op holding. All questions answered. Will proceed with laparoscopic appendectomy.    Adolfo Weinberg MD [facility]  Trauma/Emergency/Critical Care Surgery

## 2024-12-05 NOTE — PLAN OF CARE
PRIMARY Concern: Acute appendicitis  SAFETY RISK Concerns (fall risk, behaviors, etc.): None      Aggression Tool Color: Green  Isolation/Type: None  Tests/Procedures for NEXT shift: Surgery @ 0730  Consults? (Pending/following, signed-off?) Gen surg  following  Where is patient from? (Home, TCU, etc.): Home  Other Important info for NEXT shift: CHG wipes completed  Anticipated DC date & active delays: Pending   ________________________________________________________________________  SUMMARY NOTE:  Orientation/Cognitive: A&Ox4  Observation Goals (Met/ Not Met): Not met  Mobility Level/Assist Equipment: Ind in room  Antibiotics & Plan (IV/po, length of tx left): IV Zosyn q6  Pain Management: PRN IV dilaudid 0.4 mg x1, PRN IV toradol x1   Tele/VS/O2: VSS on RA  ABNL Lab/BG: BUN 20.1, CT abd--appendicitis   Diet: NPO  Bowel/Bladder: Cont  Skin Concerns: WNL  Drains/Devices: IV LR @ 125 mL/hr  Patient Stated Goal for Today: Pain control  
PRIMARY DIAGNOSIS: ACUTE PAIN  OUTPATIENT/OBSERVATION GOALS TO BE MET BEFORE DISCHARGE:  1. Pain Status: Improved but still requiring IV narcotics.    2. Return to near baseline physical activity: No    3. Cleared for discharge by consultants (if involved): No    Discharge Planner Nurse   Safe discharge environment identified: No  Barriers to discharge: Yes       Entered by: Rosalinda Li RN 12/05/2024 6:03 AM     Please review provider order for any additional goals.   Nurse to notify provider when observation goals have been met and patient is ready for discharge.Goal Outcome Evaluation:                        
Treat and Release

## 2024-12-05 NOTE — PROGRESS NOTES
PRIMARY DIAGNOSIS: ACUTE PAIN  OUTPATIENT/OBSERVATION GOALS TO BE MET BEFORE DISCHARGE:  1. Pain Status: Improved but still requiring IV narcotics.    2. Return to near baseline physical activity: No    3. Cleared for discharge by consultants (if involved): No    Discharge Planner Nurse   Safe discharge environment identified: No  Barriers to discharge: Yes       Entered by: Rosalinda Li RN 12/05/2024 1:50 AM     Please review provider order for any additional goals.   Nurse to notify provider when observation goals have been met and patient is ready for discharge.

## 2024-12-05 NOTE — PROGRESS NOTES
PRIMARY DIAGNOSIS: ACUTE PAIN/ ACUTE APPENDICITIS    OUTPATIENT/OBSERVATION GOALS TO BE MET BEFORE DISCHARGE:  1. Pain Status: Improved but still requiring IV narcotics.    2. Return to near baseline physical activity: No    3. Cleared for discharge by consultants (if involved): No    Discharge Planner Nurse   Safe discharge environment identified: No  Barriers to discharge: Yes       Entered by: Rosalinda Li RN 12/04/2024 9:54 PM     Please review provider order for any additional goals.   Nurse to notify provider when observation goals have been met and patient is ready for discharge.

## 2024-12-05 NOTE — DISCHARGE INSTRUCTIONS
Today you were given 975 mg of Tylenol at 7:20 am. The recommended daily maximum dose is 4000 mg.      Today you received Toradol, an antiinflammatory medication similar to Ibuprofen.  You should not take other antiinflammatory medication, such as Ibuprofen, Motrin, Advil, Aleve, Naprosyn, etc until 2:15 pm.          [facility]  Discharge Instructions: Post-Operative Appendectomy    ACTIVITY  Expect to feel tired after your surgery.  This will gradually resolve.    Take frequent, short walks and increase your activity gradually.    Avoid strenuous physical activity or heavy lifting greater than 15-20 lbs. for 2-3 weeks.  You may climb stairs.  You may drive without restrictions when you are not using any prescription pain medication and feel comfortable in a car.  You may return to work/school when you are comfortable without any prescription pain medication.    WOUND CARE  You may remove your outer dressing or Band-Aids and shower 48 hours after the surgery.  Pat your incisions dry and leave them open to air.  Re-apply dressing (Band-Aids or gauze/tape) as needed for comfort or drainage.  You may have steri-strips (looks like white tape) on your incision.  You may peel off the steri-strips 2 weeks after your surgery if they have not peeled off on their own.  If you have skin glue, it will peel up and fall off on its own.  Do not soak your incisions in a tub or pool for 2 weeks.   Do not apply any lotions, creams, or ointments to your incisions.  A ridge under your incisions is normal and will gradually resolve.    DIET  Start with liquids, then gradually resume your regular diet as tolerated.  Avoid heavy, spicy, and greasy meals for 2-3 days.  Drink plenty of fluids to stay hydrated.    PAIN  Expect some tenderness and discomfort at the incision sites.  Use the prescribed pain medication at your discretion.  Expect gradual resolution of your pain over several days.  You may take  ibuprofen with food (unless you have been told not to) or acetaminophen/Tylenol instead of or in addition to your prescribed pain medication.  However, if you are taking Norco or Percocet, do not take any additional acetaminophen/Tylenol.  Do not drink alcohol or drive while you are taking pain medications.  You may apply ice to your incisions in 20 minute intervals as needed for the next 48 hours.  After that time, consider switching to heat if you prefer.    EXPECTATIONS  Pain medications can cause constipation.  Limit use when possible.  Take an over the counter or prescribed stool softener/stimulant, such as Colace or Senna, 1-2 times a day with plenty of water.  You may take a mild over the counter laxative, such as Miralax or a suppository, as needed.  You make discontinue these medications once you are having regular bowel movements and/or are no longer taking your narcotic pain medication.   You may have shoulder or upper back discomfort due to the gas used in surgery.  This is temporary and should resolve in 48-72 hours.  Short, frequent walks may help with this.  If you are unable to urinate for 8 hours or feel as though you are not emptying your bladder adequately, we recommend you seek care at an ER or Urgent Care facility for possible catheter placement.     FOLLOW UP  Our office will contact you in approximately 2-3 weeks to check on your progress and answer any questions you may have.  If you are doing well, you will not need to return for a follow up appointment.  If any concerns are identified over the phone, we will help you make an appointment to see a provider.   If you have not received a phone call, have any questions or concerns, or would like to be seen, please call us at [phone] and ask to speak with our nurse.  We are located at [address] [place] [address].    CALL OUR OFFICE -[phone] IF YOU HAVE:   Chills or fever above 101 F.  Increased redness, warmth,  or drainage at your incisions.  Significant bleeding.  Pain not relieved by your pain medication or rest.  Increasing pain after the first 48 hours.  Any other concerns or questions.             Revised October 2022    Same Day Surgery Discharge Instructions for  Sedation and General Anesthesia     It's not unusual to feel dizzy, light-headed or faint for up to 24 hours after surgery or while taking pain medication.  If you have these symptoms: sit for a few minutes before standing and have someone assist you when you get up to walk or use the bathroom.    You should rest and relax for the next 24 hours. We recommend you make arrangements to have an adult stay with you for at least 24 hours after your discharge.  Avoid hazardous and strenuous activity.    DO NOT DRIVE any vehicle or operate mechanical equipment for 24 hours following the end of your surgery.  Even though you may feel normal, your reactions may be affected by the medication you have received.    Do not drink alcoholic beverages for 24 hours following surgery.     Slowly progress to your regular diet as you feel able. It's not unusual to feel nauseated and/or vomit after receiving anesthesia.  If you develop these symptoms, drink clear liquids (apple juice, ginger ale, broth, 7-up, etc. ) until you feel better.  If your nausea and vomiting persists for 24 hours, please notify your surgeon.      All narcotic pain medications, along with inactivity and anesthesia, can cause constipation. Drinking plenty of liquids and increasing fiber intake will help.    For any questions of a medical nature, call your surgeon.    Do not make important decisions for 24 hours.    If you had general anesthesia, you may have a sore throat for a couple of days related to the breathing tube used during surgery.  You may use Cepacol lozenges to help with this discomfort.  If it worsens or if you develop a fever, contact your surgeon.     If you feel your pain is not well  managed with the pain medications prescribed by your surgeon, please contact your surgeon's office to let them know so they can address your concerns.      **If you have questions or concerns about your procedure, call   Dr. Weinberg at [phone]*

## 2024-12-05 NOTE — PROVIDER NOTIFICATION
"MD Notification    Notified Person: MD    Notified Person Name: Dr. Damonger    Notification Date/Time: 12/04/24 6:53 PM     Notification Interaction: [employer] messaging    Purpose of Notification: \"Pt is requesting ibuprofen but doesn't have an order, attempted to call the after hours surgery number and all it had was a message saying \"we are having difficulties, please try again later.\" Can you order please?\"    Orders Received:    Comments:    "

## 2024-12-05 NOTE — ANESTHESIA PREPROCEDURE EVALUATION
Anesthesia Pre-Procedure Evaluation    Patient: Brie Elmore   MRN: [id number] : 1990        Procedure : Procedure(s):  APPENDECTOMY, LAPAROSCOPIC          History reviewed. No pertinent past medical history.   Past Surgical History:   Procedure Laterality Date    ORTHOPEDIC SURGERY  2010    right hand fracture      Allergies   Allergen Reactions    Azithromycin Hives    Cat Hair Extract     Dogs       Social History     Tobacco Use    Smoking status: Former     Types: Cigarettes    Smokeless tobacco: Never   Substance Use Topics    Alcohol use: No      Wt Readings from Last 1 Encounters:   24 74.8 kg (165 lb)        Anesthesia Evaluation   Pt has had prior anesthetic.     No history of anesthetic complications       ROS/MED HX  ENT/Pulmonary:    (-) tobacco use and sleep apnea   Neurologic:    (-) no seizures and no CVA   Cardiovascular:    (-) hypertension   METS/Exercise Tolerance:     Hematologic:       Musculoskeletal:       GI/Hepatic:     (+)         appendicitis,        (-) GERD and liver disease   Renal/Genitourinary:    (-) renal disease   Endo:     (+)          thyroid problem, hypothyroidism,        (-) Type II DM   Psychiatric/Substance Use:       Infectious Disease:       Malignancy:       Other:            Physical Exam    Airway        Mallampati: II   TM distance: > 3 FB   Neck ROM: full   Mouth opening: > 3 cm    Respiratory Devices and Support         Dental       (+) Minor Abnormalities - some fillings, tiny chips      Cardiovascular   cardiovascular exam normal          Pulmonary   pulmonary exam normal                OUTSIDE LABS:  CBC:   Lab Results   Component Value Date    WBC 8.9 2024    WBC 9.9 2020    HGB 14.5 2024    HGB 15.9 2020    HCT 43.0 2024    HCT 45.9 2020     2024     2020     BMP:   Lab Results   Component Value Date     2024     2020    POTASSIUM 4.2 2024    POTASSIUM 4.0  "11/27/2020    CHLORIDE 102 12/03/2024    CHLORIDE 103 11/27/2020    CO2 25 12/03/2024    CO2 24 11/27/2020    BUN 20.1 (H) 12/03/2024    BUN 22 11/27/2020    CR 0.73 12/03/2024    CR 0.92 11/27/2020     (H) 12/03/2024     (H) 11/27/2020     COAGS: No results found for: \"PTT\", \"INR\", \"FIBR\"  POC: No results found for: \"BGM\", \"HCG\", \"HCGS\"  HEPATIC:   Lab Results   Component Value Date    ALBUMIN 4.7 11/27/2020    PROTTOTAL 7.2 11/27/2020    ALT 16 11/27/2020    AST 18 11/27/2020    ALKPHOS 85 11/27/2020    BILITOTAL 0.8 11/27/2020     OTHER:   Lab Results   Component Value Date    A1C 5.2 02/05/2018    KATELIN 8.8 12/03/2024    LIPASE <9 11/27/2020    AMYLASE 44 02/04/2019    TSH 1.62 12/03/2019    T4 0.92 02/05/2018       Anesthesia Plan    ASA Status:  2, emergent    NPO Status:  NPO Appropriate    Anesthesia Type: General.     - Airway: ETT   Induction: Intravenous, RSI.   Maintenance: Balanced.   Techniques and Equipment:     - Airway: Video-Laryngoscope       Consents    Anesthesia Plan(s) and associated risks, benefits, and realistic alternatives discussed. Questions answered and patient/representative(s) expressed understanding.     - Discussed:     - Discussed with:  Patient            Postoperative Care    Pain management: Multi-modal analgesia.   PONV prophylaxis: Ondansetron (or other 5HT-3), Dexamethasone or Solumedrol, Background Propofol Infusion     Comments:               Bryan Singer MD    I have reviewed the pertinent notes and labs in the chart from the past 30 days and (re)examined the patient.  Any updates or changes from those notes are reflected in this note.                                   "

## 2024-12-05 NOTE — BRIEF OP NOTE
[facility]    Brief Operative Note    Pre-operative diagnosis: Acute appendicitis with localized peritonitis, without perforation, abscess, or gangrene [K35.30]  Post-operative diagnosis Same as pre-operative diagnosis    Procedure: APPENDECTOMY, LAPAROSCOPIC, N/A - Abdomen    Surgeon: Surgeons and Role:     * Adolfo Weinberg MD - Primary     * Vel Santos DO - Assisting  Anesthesia: General   Estimated Blood Loss: 10 mL from 12/5/2024  7:27 AM to 12/5/2024  8:25 AM    Drains: None  Specimens:   ID Type Source Tests Collected by Time Destination   1 : appendix Tissue Appendix SURGICAL PATHOLOGY EXAM Adolfo Weinberg MD 12/5/2024  8:04 AM      Findings: Mildly inflamed and dilated appendix.  Mesoappendix taken down with LigaSure device.  Base of appendix was transected using 2 Endoloop and LigaSure.  Transection margin hemostatic and no evidence of ongoing bleeding.      Complications: None.  Implants: * No implants in log *    Vel Santos DO   General Surgery, PGY-4

## 2024-12-10 LAB
PATH REPORT.COMMENTS IMP SPEC: NORMAL
PATH REPORT.COMMENTS IMP SPEC: NORMAL
PATH REPORT.FINAL DX SPEC: NORMAL
PATH REPORT.GROSS SPEC: NORMAL
PATH REPORT.MICROSCOPIC SPEC OTHER STN: NORMAL
PATH REPORT.RELEVANT HX SPEC: NORMAL
PHOTO IMAGE: NORMAL

## 2024-12-11 NOTE — DISCHARGE SUMMARY
[facility]  Surgery Discharge Summary      Date of Admission:  12/4/2024  Date of Discharge:  12/5/2024 11:05 AM  Discharging Provider: Adolfo Weinberg MD  Discharge Service: General Surgery Team    Discharge Diagnoses   Acute appendicitis    Follow-ups Needed After Discharge   Follow-up Appointments       Follow-up and recommended labs and tests       FOLLOW UP  Our office will contact you in approximately 2-3 weeks to check on your progress and answer any questions you may have.  If you are doing well, you will not need to return for a follow up appointment.  If any concerns are identified over the phone, we will help you make an appointment to see a provider.    If you have not received a phone call, have any questions or concerns, or would like to be seen, please call us at [phone] and ask to speak with our nurse.  We are located at [address] [place] [address].    CALL OUR OFFICE -[phone] IF YOU HAVE:   Chills or fever above 101 F.  Increased redness, warmth, or drainage at your incisions.  Significant bleeding.  Pain not relieved by your pain medication or rest.  Increasing pain after the first 48 hours.  Any other concerns or questions.             ---                Unresulted Labs Ordered in the Past 30 Days of this Admission       No orders found from 11/4/2024 to 12/5/2024.        These results will be followed up by General Surgery Team    Discharge Disposition   Discharged to home  Condition at discharge: Stable    Hospital Course   35 y/o M who had RLQ abdominal pain and dilated appendix. He was seen twice in the ER regarding this issue as the first time he was not expecting surgery. He underwent a laparoscopic appendectomy and did well.    Consultations This Hospital Stay   None    Code Status   Prior    Time Spent on this Encounter   IAdolfo MD, personally saw the patient today and spent less than or equal to 30 minutes discharging  this patient.       Adolfo Weinberg MD  [facility] PREOP/PHASE II  [address]  [place] [address]  Phone: [phone]  ______________________________________________________________________    Physical Exam   Vital Signs:                    Weight: 165 lbs 0 oz    Gen: NAD  Neuro: AAO       Primary Care Physician   Physician No Ref-Primary    Discharge Orders      When to call - Contact Surgeon Team    You may experience symptoms that require follow-up before your scheduled appointment. Contact your Surgeon Team if you are concerned about pain control, large amount of bleeding, blood clots, constipation, or if you experience signs of infection (fever, growing tenderness at the surgery site, a large amount of drainage, severe pain, foul-smelling drainage, redness or swelling.     When to call - Reach out to Urgent Care    If you are experiencing uncontrolled Nausea and Vomiting, uncontrolled pain, inability to urinate and uncomfortable, and in need of immediate care, and you are NOT able to reach your Surgeon Team, go to an Urgent Care clinic. Do NOT go to the Emergency Room unless you have shortness of breath, chest pain, or other signs of a medical emergency.     When to call - Reasons to Call 911    Call 911 immediately if you experience sudden-onset chest pain, arm weakness/numbness, slurred speech, or shortness of breath     Symptoms - Fever Management    A low grade fever can be expected after surgery. Your Provider many have prescribed an Opioid pain medication that also contains acetaminophen (TYLENOL) that may help with Fever management.  Do NOT take additional acetaminophen (TYLENOL) in combination with an Opioid/acetaminophen (TYLENOL) product. Read the labels on your Over The Counter (OTC) medications with care.     Symptoms - Reduced Urine Output    If it has been greater than 8 hours since you have urinated despite drinking plenty of water, call your Surgeon Team.      No driving or operating machinery    Do NOT drive any vehicle or operate mechanical equipment for 24 hours following the end of your surgery.  Even though you may feel normal, your reactions may be affected by Anesthesia medication you received.     No Alcohol    Do NOT drink alcoholic beverages for 24 hours following your surgery and while taking pain medications.     Diet Instructions    Follow your surgeon's orders for any diet restrictions.  If you did not receive any diet restrictions, you may drink clear liquids (apple juice, ginger ale, 7-up, broth, etc.), and progress to your regular diet as you feel able. It is important to stay well-hydrated after surgery and drink plenty of water.     Dressing / Wound Care - Wound    You have a clean dressing on your surgical wound.     Dressing change instructions as follows: Remove bandaids on post operative day 2. Please keep steri-strips in place until they fall off on own in 7 to 10 days.      Contact your Surgeon Team if you have increased redness, warmth around the surgical wound, and/or drainage from the surgical wound.     Discharge Instructions - Comfort and Pain Management    Pain after surgery is normal and expected. You will have some amount of pain after surgery. Your pain will improve with time. There are several things you can do to help reduce your pain including: rest, ice, and using pain medications as needed. Use pain interventions and don't wait until pain level is out of control. Contact your Surgeon Team if you have pain that persists or worsens after surgery despite rest, ice, and taking your medication(s) as prescribed. You may have a dry mouth, a sore throat, muscles aches or trouble sleeping, and these symptoms should go away after 24 hours.     Discharge Instructions - Rest    Rest and relax for the next 24 hours. Make arrangements to have someone stay with you overnight, and avoid hazardous and strenuous activities.  Do NOT make any  important decisions for the next 24 hours.     Weight bearing status - As tolerated     Shower/Bathing - Restrictions: Let water run over incisions and pat dry. No tub baths until bleeding stops.    Shower/Bathing - Restrictions: Let water run over incisions and pat dry. No tub baths until bleeding stops. Do NOT soak in any body of water (lake, pool, bath, etc.) for 7-10 days postoperatively.     Incision Care    Keep dressing clean and dry, change as instructed by Provider or RN. Wash your hands with soap and warm water before you touch the site of surgery. If you have skin tapes (steri-strips) on your surgical site, leave them on the surgical site until they fall off on their own (typically 7-10 days). If you have stitches under the skin, they will dissolve, or your doctor will give you instructions on when to return to their office for removal. Unless directed otherwise, remove dressing, if present, the day after surgery and leave open to air. If Dermabond (a type of skin glue) is present, leave in place until it wears/flakes off (2-3 weeks).     Return to normal activity as tolerated    Return to normal activity as tolerated     May return to work (WITHOUT restrictions)    May return to work in 4 week(s) with NO restrictions.     Reason for your hospital stay    Surgery: Laparoscopic Appendectomy     Activity    Your activity upon discharge: activity as tolerated     Follow-up and recommended labs and tests     FOLLOW UP  Our office will contact you in approximately 2-3 weeks to check on your progress and answer any questions you may have.  If you are doing well, you will not need to return for a follow up appointment.  If any concerns are identified over the phone, we will help you make an appointment to see a provider.    If you have not received a phone call, have any questions or concerns, or would like to be seen, please call us at [phone] and ask to speak with our nurse.  We are located at [address]  [facility] [address] [place] [address].    CALL OUR OFFICE -[phone] IF YOU HAVE:   Chills or fever above 101 F.  Increased redness, warmth, or drainage at your incisions.  Significant bleeding.  Pain not relieved by your pain medication or rest.  Increasing pain after the first 48 hours.  Any other concerns or questions.             ---     Diet    Follow this diet upon discharge: Regular         Discharge Medications   Discharge Medication List as of 12/5/2024  9:17 AM        START taking these medications    Details   oxyCODONE (ROXICODONE) 5 MG tablet Take 1 tablet (5 mg) by mouth every 4 hours as needed for moderate pain., Disp-15 tablet, R-0, E-Prescribe           CONTINUE these medications which have CHANGED    Details   senna-docusate (SENOKOT-S/PERICOLACE) 8.6-50 MG tablet Take 1 tablet by mouth 2 times daily., Disp-15 tablet, R-0, E-Prescribe           CONTINUE these medications which have NOT CHANGED    Details   buprenorphine HCl-naloxone HCl (SUBOXONE) 8-2 MG per film Place 3 Film under the tongue daily., Historical      multivitamin, therapeutic with minerals (MULTI-VITAMIN) TABS tablet Take 1 tablet by mouth daily, Historical           Allergies   Allergies   Allergen Reactions    Azithromycin Hives    Cat Hair Extract     Dogs

## 2025-01-12 ENCOUNTER — HEALTH MAINTENANCE LETTER (OUTPATIENT)
Age: 35
End: 2025-01-12

## (undated) DEVICE — LINEN TOWEL PACK X5 5464

## (undated) DEVICE — PREP CHLORAPREP 26ML TINTED HI-LITE ORANGE 930815

## (undated) DEVICE — ENDO SCOPE WARMER LF TM500

## (undated) DEVICE — ESU GROUND PAD UNIVERSAL W/O CORD

## (undated) DEVICE — SOL NACL 0.9% INJ 1000ML BAG 2B1324X

## (undated) DEVICE — NDL INSUFFLATION 13GA 120MM C2201

## (undated) DEVICE — SUCTION IRR STRYKERFLOW II W/TIP 250-070-520

## (undated) DEVICE — ENDO TROCAR SLEEVE KII Z-THREADED 05X100MM CTS02

## (undated) DEVICE — ESU HOLDER LAP INST DISP PURPLE LONG 330MM H-PRO-330

## (undated) DEVICE — GLOVE BIOGEL PI MICRO SZ 5.5 48555

## (undated) DEVICE — SU VICRYL 4-0 PS-2 18" UND J496H

## (undated) DEVICE — ENDO POUCH UNIV RETRIEVAL SYSTEM INZII 10MM CD001

## (undated) DEVICE — SU VICRYL 0 ENDOLOOP 18" EJ10

## (undated) DEVICE — ENDO TROCAR FIRST ENTRY KII FIOS Z-THRD 11X100MM CTF33

## (undated) DEVICE — SU VICRYL 0 UR-6 27" J603H

## (undated) DEVICE — DEVICE SUTURE PASSER 14GA WECK EFX EFXSP2

## (undated) DEVICE — SU PDS II 0 ENDOLOOP EZ10G

## (undated) DEVICE — SOL WATER IRRIG 1000ML BOTTLE 2F7114

## (undated) DEVICE — PACK LAP CHOLE SLC15LCFSD

## (undated) DEVICE — ENDO TROCAR FIRST ENTRY KII FIOS Z-THRD 05X100MM CTF03

## (undated) DEVICE — DRAPE TOWEL IMPERVIOUS X2 7553

## (undated) DEVICE — GLOVE PROTEXIS W/NEU-THERA 5.5  2D73TE55

## (undated) RX ORDER — PROPOFOL 10 MG/ML
INJECTION, EMULSION INTRAVENOUS
Status: DISPENSED
Start: 2024-12-05

## (undated) RX ORDER — HEPARIN SODIUM 5000 [USP'U]/.5ML
INJECTION, SOLUTION INTRAVENOUS; SUBCUTANEOUS
Status: DISPENSED
Start: 2024-12-05

## (undated) RX ORDER — KETOROLAC TROMETHAMINE 30 MG/ML
INJECTION, SOLUTION INTRAMUSCULAR; INTRAVENOUS
Status: DISPENSED
Start: 2024-12-05

## (undated) RX ORDER — DEXAMETHASONE SODIUM PHOSPHATE 4 MG/ML
INJECTION, SOLUTION INTRA-ARTICULAR; INTRALESIONAL; INTRAMUSCULAR; INTRAVENOUS; SOFT TISSUE
Status: DISPENSED
Start: 2024-12-05

## (undated) RX ORDER — ONDANSETRON 2 MG/ML
INJECTION INTRAMUSCULAR; INTRAVENOUS
Status: DISPENSED
Start: 2024-12-05

## (undated) RX ORDER — FENTANYL CITRATE 50 UG/ML
INJECTION, SOLUTION INTRAMUSCULAR; INTRAVENOUS
Status: DISPENSED
Start: 2024-12-05

## (undated) RX ORDER — ACETAMINOPHEN 325 MG/1
TABLET ORAL
Status: DISPENSED
Start: 2024-12-05

## (undated) RX ORDER — OXYCODONE HYDROCHLORIDE 5 MG/1
TABLET ORAL
Status: DISPENSED
Start: 2024-12-05

## (undated) RX ORDER — GLYCOPYRROLATE 0.2 MG/ML
INJECTION, SOLUTION INTRAMUSCULAR; INTRAVENOUS
Status: DISPENSED
Start: 2024-12-05

## (undated) RX ORDER — BUPIVACAINE HYDROCHLORIDE AND EPINEPHRINE 5; 5 MG/ML; UG/ML
INJECTION, SOLUTION EPIDURAL; INTRACAUDAL; PERINEURAL
Status: DISPENSED
Start: 2024-12-05

## (undated) RX ORDER — HYDROMORPHONE HYDROCHLORIDE 1 MG/ML
INJECTION, SOLUTION INTRAMUSCULAR; INTRAVENOUS; SUBCUTANEOUS
Status: DISPENSED
Start: 2024-12-05

## (undated) RX ORDER — CEFAZOLIN SODIUM/WATER 2 G/20 ML
SYRINGE (ML) INTRAVENOUS
Status: DISPENSED
Start: 2024-12-05